# Patient Record
Sex: FEMALE | Race: OTHER | Employment: STUDENT | URBAN - METROPOLITAN AREA
[De-identification: names, ages, dates, MRNs, and addresses within clinical notes are randomized per-mention and may not be internally consistent; named-entity substitution may affect disease eponyms.]

---
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
## 2019-09-26 ENCOUNTER — HOSPITAL ENCOUNTER (OUTPATIENT)
Age: 19
Discharge: HOME | End: 2019-09-26
Attending: EMERGENCY MEDICINE
Payer: COMMERCIAL

## 2019-09-26 ENCOUNTER — NURSE TRIAGE (OUTPATIENT)
Dept: FAMILY MEDICINE CLINIC | Facility: CLINIC | Age: 19
End: 2019-09-26

## 2019-09-26 VITALS
TEMPERATURE: 98 F | SYSTOLIC BLOOD PRESSURE: 114 MMHG | OXYGEN SATURATION: 99 % | RESPIRATION RATE: 18 BRPM | HEIGHT: 62 IN | HEART RATE: 72 BPM | DIASTOLIC BLOOD PRESSURE: 74 MMHG | WEIGHT: 182 LBS | BODY MASS INDEX: 33.49 KG/M2

## 2019-09-26 DIAGNOSIS — R19.7 DIARRHEA, UNSPECIFIED TYPE: Primary | ICD-10-CM

## 2019-09-26 DIAGNOSIS — K52.9 GASTROENTERITIS: ICD-10-CM

## 2019-09-26 LAB
#MXD IC: 1.1 X10ˆ3/UL (ref 0.1–1)
B-HCG UR QL: NEGATIVE
BILIRUB UR QL STRIP: NEGATIVE
CLARITY UR: CLEAR
COLOR UR: YELLOW
CREAT BLD-MCNC: 0.7 MG/DL (ref 0.55–1.02)
GLUCOSE BLD-MCNC: 91 MG/DL (ref 70–99)
GLUCOSE UR STRIP-MCNC: NEGATIVE MG/DL
HCT VFR BLD AUTO: 39.8 % (ref 35–48)
HGB BLD-MCNC: 13.3 G/DL (ref 12–16)
ISTAT BUN: 14 MG/DL (ref 8–20)
ISTAT CHLORIDE: 104 MMOL/L (ref 101–111)
ISTAT HEMATOCRIT: 41 % (ref 34–50)
ISTAT IONIZED CALCIUM FOR CHEM 8: 1.18 MMOL/L (ref 1.12–1.32)
ISTAT POTASSIUM: 3.9 MMOL/L (ref 3.6–5.1)
ISTAT SODIUM: 140 MMOL/L (ref 136–145)
ISTAT TCO2: 26 MMOL/L (ref 22–32)
KETONES UR STRIP-MCNC: NEGATIVE MG/DL
LYMPHOCYTES # BLD AUTO: 3 X10ˆ3/UL (ref 1–4)
LYMPHOCYTES NFR BLD AUTO: 39.1 %
MCH RBC QN AUTO: 32.3 PG (ref 26–34)
MCHC RBC AUTO-ENTMCNC: 33.4 G/DL (ref 31–37)
MCV RBC AUTO: 96.6 FL (ref 80–100)
MIXED CELL %: 14 %
NEUTROPHILS # BLD AUTO: 3.7 X10ˆ3/UL (ref 1.5–7.7)
NEUTROPHILS NFR BLD AUTO: 46.9 %
NITRITE UR QL STRIP: NEGATIVE
PH UR STRIP: 5.5 [PH]
PLATELET # BLD AUTO: 249 X10ˆ3/UL (ref 150–450)
RBC # BLD AUTO: 4.12 X10ˆ6/UL (ref 3.8–5.3)
SP GR UR STRIP: >=1.03
UROBILINOGEN UR STRIP-ACNC: <2 MG/DL
WBC # BLD AUTO: 7.8 X10ˆ3/UL (ref 4–11)

## 2019-09-26 PROCEDURE — 80047 BASIC METABLC PNL IONIZED CA: CPT

## 2019-09-26 PROCEDURE — 99214 OFFICE O/P EST MOD 30 MIN: CPT

## 2019-09-26 PROCEDURE — 81002 URINALYSIS NONAUTO W/O SCOPE: CPT

## 2019-09-26 PROCEDURE — 87086 URINE CULTURE/COLONY COUNT: CPT | Performed by: EMERGENCY MEDICINE

## 2019-09-26 PROCEDURE — 85025 COMPLETE CBC W/AUTO DIFF WBC: CPT | Performed by: EMERGENCY MEDICINE

## 2019-09-26 PROCEDURE — 81025 URINE PREGNANCY TEST: CPT

## 2019-09-26 PROCEDURE — 36415 COLL VENOUS BLD VENIPUNCTURE: CPT

## 2019-09-26 RX ORDER — DICYCLOMINE HCL 20 MG
10 TABLET ORAL 3 TIMES DAILY PRN
Qty: 30 TABLET | Refills: 0 | Status: SHIPPED | OUTPATIENT
Start: 2019-09-26 | End: 2020-01-24

## 2019-09-26 RX ORDER — ONDANSETRON 4 MG/1
4 TABLET, ORALLY DISINTEGRATING ORAL EVERY 4 HOURS PRN
Qty: 10 TABLET | Refills: 0 | Status: SHIPPED | OUTPATIENT
Start: 2019-09-26 | End: 2019-10-03

## 2019-09-26 RX ORDER — PANTOPRAZOLE SODIUM 40 MG/1
40 TABLET, DELAYED RELEASE ORAL
Qty: 15 TABLET | Refills: 0 | Status: SHIPPED | OUTPATIENT
Start: 2019-09-26 | End: 2019-10-26

## 2019-09-26 RX ORDER — ONDANSETRON 4 MG/1
4 TABLET, ORALLY DISINTEGRATING ORAL ONCE
Status: COMPLETED | OUTPATIENT
Start: 2019-09-26 | End: 2019-09-26

## 2019-09-26 RX ORDER — DICYCLOMINE HYDROCHLORIDE 10 MG/5ML
20 SOLUTION ORAL ONCE
Status: COMPLETED | OUTPATIENT
Start: 2019-09-26 | End: 2019-09-26

## 2019-09-26 RX ORDER — FAMOTIDINE 20 MG/1
20 TABLET ORAL ONCE
Status: COMPLETED | OUTPATIENT
Start: 2019-09-26 | End: 2019-09-26

## 2019-09-26 RX ORDER — MAGNESIUM HYDROXIDE/ALUMINUM HYDROXICE/SIMETHICONE 120; 1200; 1200 MG/30ML; MG/30ML; MG/30ML
30 SUSPENSION ORAL ONCE
Status: COMPLETED | OUTPATIENT
Start: 2019-09-26 | End: 2019-09-26

## 2019-09-26 RX ORDER — ACETAMINOPHEN 500 MG
500 TABLET ORAL ONCE
Status: COMPLETED | OUTPATIENT
Start: 2019-09-26 | End: 2019-09-26

## 2019-09-26 NOTE — TELEPHONE ENCOUNTER
Action Requested: Summary for Provider     []  Critical Lab, Recommendations Needed  [] Need Additional Advice  [x]   FYI    []   Need Orders  [] Need Medications Sent to Pharmacy  []  Other     SUMMARY: Per protocol advised ER now and pt agrees.  Staff to

## 2019-09-26 NOTE — TELEPHONE ENCOUNTER
Pt mom calling and states daughter have diarrhea for five days, no appetite and a severe headache, and nauseas just drinking fluids.         Please advise     X-karlee to triage

## 2019-09-26 NOTE — ED INITIAL ASSESSMENT (HPI)
Abdominal pain, nausea, diarrhea, bloating since Sunday. Reports some \"spots of blood\" in stool and some \"black\" stools.

## 2019-09-26 NOTE — TELEPHONE ENCOUNTER
Mother calling as pt is in school. Mother wanting a appt for this evening. I inform mother that we need to speak to pt. Mother verbalized understanding and will have pt call us back.

## 2019-09-26 NOTE — ED PROVIDER NOTES
Patient Seen in: [facility] Immediate Care In [address] [place] [address]    History   Patient presents with:  Abdomen/Flank Pain (GI/)    Stated Complaint: h/a, nausea, bloating, ab pain    HPI    Patient complains of diarrhea, this began Sunday night, bloody(barbara 114/74   Pulse 72   Temp 98.3 °F (36.8 °C) (Oral)   Resp 18   Ht 157.5 cm (5' 2\")   Wt 82.6 kg   SpO2 99%   BMI 33.29 kg/m²   PULSE OX   GENERAL: no acute distress  HEAD: normocephalic, atraumatic,   EYES: PERRLA, EOMI, conj sclera clear  THROAT: mmm, no total) by mouth every morning before breakfast.  Qty: 15 tablet Refills: 0                    Disposition and Plan     Clinical Impression:  Diarrhea, unspecified type  (primary encounter diagnosis)  Gastroenteritis    Disposition:  Discharge    Follow-up:

## 2019-09-27 NOTE — TELEPHONE ENCOUNTER
Patient was seen in urgent care yesterday.      Disposition and Plan    Clinical Impression:  Diarrhea, unspecified type  (primary encounter diagnosis)  Gastroenteritis     Disposition:  Discharge     Follow-up:  No follow-up provider specified.     Medicat

## 2019-12-27 ENCOUNTER — HOSPITAL ENCOUNTER (OUTPATIENT)
Age: 19
Discharge: HOME | End: 2019-12-27
Payer: COMMERCIAL

## 2019-12-27 VITALS
HEIGHT: 62 IN | TEMPERATURE: 99 F | BODY MASS INDEX: 32.76 KG/M2 | DIASTOLIC BLOOD PRESSURE: 78 MMHG | SYSTOLIC BLOOD PRESSURE: 123 MMHG | OXYGEN SATURATION: 97 % | HEART RATE: 80 BPM | RESPIRATION RATE: 18 BRPM | WEIGHT: 178 LBS

## 2019-12-27 DIAGNOSIS — R05.9 COUGH: Primary | ICD-10-CM

## 2019-12-27 LAB — S PYO AG THROAT QL: NEGATIVE

## 2019-12-27 PROCEDURE — 99213 OFFICE O/P EST LOW 20 MIN: CPT

## 2019-12-27 PROCEDURE — 87430 STREP A AG IA: CPT

## 2019-12-27 PROCEDURE — 99214 OFFICE O/P EST MOD 30 MIN: CPT

## 2019-12-27 RX ORDER — FLUTICASONE PROPIONATE 50 MCG
2 SPRAY, SUSPENSION (ML) NASAL DAILY
Qty: 16 G | Refills: 0 | Status: SHIPPED | OUTPATIENT
Start: 2019-12-27 | End: 2020-01-26

## 2019-12-27 RX ORDER — BENZONATATE 100 MG/1
100 CAPSULE ORAL 3 TIMES DAILY PRN
Qty: 30 CAPSULE | Refills: 0 | Status: SHIPPED | OUTPATIENT
Start: 2019-12-27 | End: 2020-01-24

## 2019-12-27 NOTE — ED INITIAL ASSESSMENT (HPI)
Cough x 3 weeks. Feels nauseated with the cough. Reports \"vomiting phlegm\" this morning. Intermittent diarrhea x one week.

## 2019-12-27 NOTE — ED PROVIDER NOTES
Patient presents with:  Cough/URI      HPI:     Scott Dupree is a 20year old female past medical history presents with chief complaint of cough, runny nose, sore throat over the course the last 3 weeks.   States she has had a itchy sore throat as well Flonase. Salt water gargles and tea with honey may help soothe throat. I will also give a prescription for Tessalon Perles as well.       Orders Placed This Encounter      POCT Rapid Strep      benzonatate 100 MG Oral Cap          Sig: Take 1 capsule (100

## 2020-01-24 ENCOUNTER — APPOINTMENT (OUTPATIENT)
Dept: LAB | Age: 20
End: 2020-01-24
Attending: FAMILY MEDICINE
Payer: COMMERCIAL

## 2020-01-24 ENCOUNTER — OFFICE VISIT (OUTPATIENT)
Dept: FAMILY MEDICINE CLINIC | Facility: CLINIC | Age: 20
End: 2020-01-24
Payer: COMMERCIAL

## 2020-01-24 VITALS
TEMPERATURE: 99 F | HEIGHT: 62 IN | SYSTOLIC BLOOD PRESSURE: 118 MMHG | DIASTOLIC BLOOD PRESSURE: 77 MMHG | HEART RATE: 73 BPM | WEIGHT: 174.19 LBS | BODY MASS INDEX: 32.05 KG/M2

## 2020-01-24 DIAGNOSIS — J30.89 OTHER ALLERGIC RHINITIS: ICD-10-CM

## 2020-01-24 DIAGNOSIS — J34.2 DEVIATED NASAL SEPTUM: ICD-10-CM

## 2020-01-24 DIAGNOSIS — J34.89 NASAL OBSTRUCTION: ICD-10-CM

## 2020-01-24 DIAGNOSIS — R51.9 SINUS HEADACHE: ICD-10-CM

## 2020-01-24 DIAGNOSIS — J34.89 NASAL OBSTRUCTION: Primary | ICD-10-CM

## 2020-01-24 PROCEDURE — 36415 COLL VENOUS BLD VENIPUNCTURE: CPT

## 2020-01-24 PROCEDURE — 86003 ALLG SPEC IGE CRUDE XTRC EA: CPT

## 2020-01-24 PROCEDURE — 99214 OFFICE O/P EST MOD 30 MIN: CPT | Performed by: FAMILY MEDICINE

## 2020-01-24 PROCEDURE — 82785 ASSAY OF IGE: CPT

## 2020-01-24 RX ORDER — MONTELUKAST SODIUM 10 MG/1
10 TABLET ORAL NIGHTLY
Qty: 90 TABLET | Refills: 1 | Status: SHIPPED | OUTPATIENT
Start: 2020-01-24 | End: 2020-07-22

## 2020-01-24 NOTE — PROGRESS NOTES
Patient ID: Dimitri Dai is a 20year old female.     HPI  Patient presents with:  Nose Problem: pt states nose always feels congested and she has a hard time breathing  Headache:  states headaches have been going on for a long time    Pt feels her no data.    BP Readings from Last 6 Encounters:  01/24/20 : 118/77  12/27/19 : 123/78  09/26/19 : 114/74  05/07/18 : 137/73  12/12/17 : 123/85  10/23/17 : 118/76        Review of Systems   Constitutional: Negative for chills and fever.    HENT: Positive for co person, place, and time. Skin: Skin is warm. Psychiatry: Normal mood and affect. Vitals reviewed.     Blood pressure 118/77, pulse 73, temperature 98.8 °F (37.1 °C), temperature source Oral, height 5' 2\" (1.575 m), weight 174 lb 3.2 oz (79 kg), last instructions (if applicable) and agree that the record reflects my personal performance and is accurate and complete.   Carlos Cordero DO, 1/24/2020, 12:40 PM

## 2020-01-28 LAB
A ALTERNATA IGE QN: <0.1 KUA/L (ref ?–0.1)
C HERBARUM IGE QN: <0.1 KUA/L (ref ?–0.1)
CAT DANDER IGE QN: <0.1 KUA/L (ref ?–0.1)
COMMON RAGWEED IGE QN: <0.1 KUA/L (ref ?–0.1)
D FARINAE IGE QN: 0.18 KUA/L (ref ?–0.1)
DOG DANDER IGE QN: <0.1 KUA/L (ref ?–0.1)
GOOSEFOOT IGE QN: <0.1 KUA/L (ref ?–0.1)
HOUSE DUST HS IGE QN: <0.1 KUA/L (ref ?–0.1)
IGE SERPL-ACNC: 88.1 KU/L (ref 2–214)
KENT BLUE GRASS IGE QN: 0.12 KUA/L (ref ?–0.1)
PER RYE GRASS IGE QN: 0.14 KUA/L (ref ?–0.1)
WHITE ELM IGE QN: <0.1 KUA/L (ref ?–0.1)
WHITE OAK IGE QN: <0.1 KUA/L (ref ?–0.1)

## 2020-02-05 ENCOUNTER — HOSPITAL ENCOUNTER (OUTPATIENT)
Dept: CT IMAGING | Facility: HOSPITAL | Age: 20
Discharge: HOME | End: 2020-02-05
Attending: FAMILY MEDICINE
Payer: COMMERCIAL

## 2020-02-05 DIAGNOSIS — J34.89 NASAL OBSTRUCTION: ICD-10-CM

## 2020-02-05 DIAGNOSIS — J30.89 OTHER ALLERGIC RHINITIS: ICD-10-CM

## 2020-02-05 DIAGNOSIS — J34.2 DEVIATED NASAL SEPTUM: ICD-10-CM

## 2020-02-05 PROCEDURE — 70486 CT MAXILLOFACIAL W/O DYE: CPT | Performed by: FAMILY MEDICINE

## 2020-02-08 ENCOUNTER — PATIENT MESSAGE (OUTPATIENT)
Dept: FAMILY MEDICINE CLINIC | Facility: CLINIC | Age: 20
End: 2020-02-08

## 2020-04-28 ENCOUNTER — TELEPHONE (OUTPATIENT)
Dept: OTOLARYNGOLOGY | Facility: CLINIC | Age: 20
End: 2020-04-28

## 2020-04-28 NOTE — TELEPHONE ENCOUNTER
Patient returned call. She states she has been dealing with sinus issues all of her life. Recently had CT sinus which showed mild-moderate nasal septal deviation. Dr. Geena Mack would like her to see ENT to discuss possible septoplasty.      Currently she f

## 2020-06-01 ENCOUNTER — OFFICE VISIT (OUTPATIENT)
Dept: OTOLARYNGOLOGY | Facility: CLINIC | Age: 20
End: 2020-06-01
Payer: COMMERCIAL

## 2020-06-01 VITALS
BODY MASS INDEX: 28.52 KG/M2 | DIASTOLIC BLOOD PRESSURE: 68 MMHG | TEMPERATURE: 99 F | SYSTOLIC BLOOD PRESSURE: 106 MMHG | WEIGHT: 155 LBS | HEIGHT: 62 IN

## 2020-06-01 DIAGNOSIS — J34.2 DEVIATED NASAL SEPTUM: Primary | ICD-10-CM

## 2020-06-01 PROCEDURE — 99243 OFF/OP CNSLTJ NEW/EST LOW 30: CPT | Performed by: OTOLARYNGOLOGY

## 2020-06-01 NOTE — PROGRESS NOTES
Harley Hendricks is a 20year old female. Patient presents with:  Nose Problem: pt presents with snoring, trouble breathing through nose     HPI:   For many years she is had difficulty breathing through her nose especially the right side.   She rep Normal Facial features - Normal. Eyebrows - Normal. Skull - Normal.   Oral/Oropharynx Normal Lips - Normal, Tonsils - Normal, Tongue - Normal    Nasal Normal External nose -nasal pyramid deviated to the left side.   Nasal septal deviation to the right   Geovanni

## 2020-08-10 ENCOUNTER — OFFICE VISIT (OUTPATIENT)
Dept: FAMILY MEDICINE CLINIC | Facility: CLINIC | Age: 20
End: 2020-08-10
Payer: COMMERCIAL

## 2020-08-10 ENCOUNTER — LAB ENCOUNTER (OUTPATIENT)
Dept: LAB | Age: 20
End: 2020-08-10
Attending: FAMILY MEDICINE
Payer: COMMERCIAL

## 2020-08-10 VITALS
HEIGHT: 62 IN | SYSTOLIC BLOOD PRESSURE: 120 MMHG | TEMPERATURE: 98 F | DIASTOLIC BLOOD PRESSURE: 77 MMHG | HEART RATE: 79 BPM | BODY MASS INDEX: 27.45 KG/M2 | WEIGHT: 149.19 LBS

## 2020-08-10 DIAGNOSIS — L65.9 HAIR LOSS: ICD-10-CM

## 2020-08-10 DIAGNOSIS — Z00.00 ADULT GENERAL MEDICAL EXAM: ICD-10-CM

## 2020-08-10 DIAGNOSIS — Z83.49 FAMILY HISTORY OF HYPOTHYROIDISM: ICD-10-CM

## 2020-08-10 DIAGNOSIS — Z00.00 ADULT GENERAL MEDICAL EXAM: Primary | ICD-10-CM

## 2020-08-10 LAB
ALBUMIN SERPL-MCNC: 3.7 G/DL (ref 3.4–5)
ALBUMIN/GLOB SERPL: 0.9 {RATIO} (ref 1–2)
ALP LIVER SERPL-CCNC: 55 U/L (ref 52–144)
ALT SERPL-CCNC: 14 U/L (ref 13–56)
ANION GAP SERPL CALC-SCNC: 4 MMOL/L (ref 0–18)
AST SERPL-CCNC: 12 U/L (ref 15–37)
BASOPHILS # BLD AUTO: 0.03 X10(3) UL (ref 0–0.2)
BASOPHILS NFR BLD AUTO: 0.4 %
BILIRUB SERPL-MCNC: 0.4 MG/DL (ref 0.1–2)
BUN BLD-MCNC: 11 MG/DL (ref 7–18)
BUN/CREAT SERPL: 12.9 (ref 10–20)
CALCIUM BLD-MCNC: 8.9 MG/DL (ref 8.5–10.1)
CHLORIDE SERPL-SCNC: 107 MMOL/L (ref 98–112)
CHOLEST SMN-MCNC: 133 MG/DL (ref ?–200)
CO2 SERPL-SCNC: 29 MMOL/L (ref 21–32)
CREAT BLD-MCNC: 0.85 MG/DL (ref 0.55–1.02)
DEPRECATED RDW RBC AUTO: 45.9 FL (ref 35.1–46.3)
EOSINOPHIL # BLD AUTO: 0.19 X10(3) UL (ref 0–0.7)
EOSINOPHIL NFR BLD AUTO: 2.6 %
ERYTHROCYTE [DISTWIDTH] IN BLOOD BY AUTOMATED COUNT: 12.5 % (ref 11–15)
GLOBULIN PLAS-MCNC: 4 G/DL (ref 2.8–4.4)
GLUCOSE BLD-MCNC: 84 MG/DL (ref 70–99)
HCT VFR BLD AUTO: 39.7 % (ref 35–48)
HDLC SERPL-MCNC: 58 MG/DL (ref 40–59)
HGB BLD-MCNC: 13.1 G/DL (ref 12–16)
IMM GRANULOCYTES # BLD AUTO: 0.01 X10(3) UL (ref 0–1)
IMM GRANULOCYTES NFR BLD: 0.1 %
LDLC SERPL CALC-MCNC: 62 MG/DL (ref ?–100)
LYMPHOCYTES # BLD AUTO: 2.18 X10(3) UL (ref 1–4)
LYMPHOCYTES NFR BLD AUTO: 30.4 %
M PROTEIN MFR SERPL ELPH: 7.7 G/DL (ref 6.4–8.2)
MCH RBC QN AUTO: 33.1 PG (ref 26–34)
MCHC RBC AUTO-ENTMCNC: 33 G/DL (ref 31–37)
MCV RBC AUTO: 100.3 FL (ref 80–100)
MONOCYTES # BLD AUTO: 0.61 X10(3) UL (ref 0.1–1)
MONOCYTES NFR BLD AUTO: 8.5 %
NEUTROPHILS # BLD AUTO: 4.15 X10 (3) UL (ref 1.5–7.7)
NEUTROPHILS # BLD AUTO: 4.15 X10(3) UL (ref 1.5–7.7)
NEUTROPHILS NFR BLD AUTO: 58 %
NONHDLC SERPL-MCNC: 75 MG/DL (ref ?–130)
OSMOLALITY SERPL CALC.SUM OF ELEC: 289 MOSM/KG (ref 275–295)
PATIENT FASTING Y/N/NP: YES
PATIENT FASTING Y/N/NP: YES
PLATELET # BLD AUTO: 221 10(3)UL (ref 150–450)
POTASSIUM SERPL-SCNC: 4.4 MMOL/L (ref 3.5–5.1)
RBC # BLD AUTO: 3.96 X10(6)UL (ref 3.8–5.3)
SODIUM SERPL-SCNC: 140 MMOL/L (ref 136–145)
T4 FREE SERPL-MCNC: 1 NG/DL (ref 0.8–1.7)
TRIGL SERPL-MCNC: 67 MG/DL (ref 30–149)
TSI SER-ACNC: 1.78 MIU/ML (ref 0.36–3.74)
VLDLC SERPL CALC-MCNC: 13 MG/DL (ref 0–30)
WBC # BLD AUTO: 7.2 X10(3) UL (ref 4–11)

## 2020-08-10 PROCEDURE — 80053 COMPREHEN METABOLIC PANEL: CPT

## 2020-08-10 PROCEDURE — 82306 VITAMIN D 25 HYDROXY: CPT

## 2020-08-10 PROCEDURE — 99212 OFFICE O/P EST SF 10 MIN: CPT | Performed by: FAMILY MEDICINE

## 2020-08-10 PROCEDURE — 36415 COLL VENOUS BLD VENIPUNCTURE: CPT

## 2020-08-10 PROCEDURE — 84439 ASSAY OF FREE THYROXINE: CPT

## 2020-08-10 PROCEDURE — 3008F BODY MASS INDEX DOCD: CPT | Performed by: FAMILY MEDICINE

## 2020-08-10 PROCEDURE — 3078F DIAST BP <80 MM HG: CPT | Performed by: FAMILY MEDICINE

## 2020-08-10 PROCEDURE — 84443 ASSAY THYROID STIM HORMONE: CPT

## 2020-08-10 PROCEDURE — 85025 COMPLETE CBC W/AUTO DIFF WBC: CPT

## 2020-08-10 PROCEDURE — 80061 LIPID PANEL: CPT

## 2020-08-10 PROCEDURE — 3074F SYST BP LT 130 MM HG: CPT | Performed by: FAMILY MEDICINE

## 2020-08-10 PROCEDURE — 99395 PREV VISIT EST AGE 18-39: CPT | Performed by: FAMILY MEDICINE

## 2020-08-10 NOTE — PROGRESS NOTES
Patient ID: Macy Finch is a 20year old female. HPI  Patient presents with:  Physical: hair loss     Last seen by me on 1/24/2020. Pt is working and in school. Pt has been experiencing hair loss since April that is worsening.  Pt st HCT 39.3 06/07/2014     06/07/2014    MPV 9.8 06/07/2014    MCV 96.6 09/26/2019    MCH 31.4 06/07/2014    MCHC 33.4 09/26/2019    RDW 14.0 06/07/2014    NEUT 4.5 06/07/2014    LYMPH 2.3 06/07/2014    MON 0.8 06/07/2014    EOS 0.2 06/07/2014    TYRELL kg/m²  12/27/19 : 32.56 kg/m²  09/26/19 : 33.29 kg/m²  05/07/18 : 32.42 kg/m² (96 %, Z= 1.79)*    * Growth percentiles are based on CDC (Girls, 2-20 Years) data.     BP Readings from Last 6 Encounters:  08/10/20 : 120/77  06/01/20 : 106/68  01/24/20 : 118/7 connections:        Talks on phone: Not on file        Gets together: Not on file        Attends Voodoo service: Not on file        Active member of club or organization: Not on file        Attends meetings of clubs or organizations: Not on file Soft. Bowel sounds are normal. There is no hepatosplenomegaly. There is no tenderness. Lymphadenopathy:     She has no cervical adenopathy. Lower legs: No edema of the legs bilaterally. Good pedal pulses.   Neurological: She is alert and oriented to per described in this documentation. All medical record entries made by the scribe were at my direction and in my presence.   I have reviewed the chart and discharge instructions (if applicable) and agree that the record reflects my personal performance and is

## 2020-08-10 NOTE — PROGRESS NOTES
Patient ID: Jaya Cordoba is a 20year old female. No chief complaint on file. 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 She does not smoke.     The patient denies any CP, SOB, blood in the stool, or bl Date    GLU 91 06/07/2014    BUN 15 06/07/2014    BUNCREA 20.8 (H) 06/07/2014    CREATSERUM 0.72 06/07/2014    ANIONGAP 6 06/07/2014    GFRNAA 125 09/26/2019    GFRAA 144 09/26/2019    CA 9.8 06/07/2014    OSMOCALC 290 06/07/2014    AST 22 06/07/2014    AL Cardiovascular: Negative for chest pain. Gastrointestinal: Negative for abdominal pain and blood in stool. Genitourinary: Negative for hematuria. Musculoskeletal: Positive for back pain. Skin: Negative for color change.    Allergic/Immunologic: Ne if symptoms persist.  Take medicine (if given) as prescribed. Approach to treatment discussed and patient/family member understands and agrees to plan. No follow-ups on file. There are no Patient Instructions on file for this visit.         Josiah Campos

## 2020-08-12 LAB — 25(OH)D3 SERPL-MCNC: 25.4 NG/ML (ref 30–100)

## 2021-04-20 ENCOUNTER — HOSPITAL ENCOUNTER (OUTPATIENT)
Age: 21
Discharge: HOME | End: 2021-04-20
Payer: COMMERCIAL

## 2021-04-20 VITALS
OXYGEN SATURATION: 100 % | HEART RATE: 82 BPM | RESPIRATION RATE: 18 BRPM | DIASTOLIC BLOOD PRESSURE: 89 MMHG | TEMPERATURE: 99 F | SYSTOLIC BLOOD PRESSURE: 139 MMHG

## 2021-04-20 DIAGNOSIS — J02.0 STREPTOCOCCAL SORE THROAT: Primary | ICD-10-CM

## 2021-04-20 PROCEDURE — 87880 STREP A ASSAY W/OPTIC: CPT | Performed by: NURSE PRACTITIONER

## 2021-04-20 PROCEDURE — U0002 COVID-19 LAB TEST NON-CDC: HCPCS | Performed by: NURSE PRACTITIONER

## 2021-04-20 PROCEDURE — 99213 OFFICE O/P EST LOW 20 MIN: CPT | Performed by: NURSE PRACTITIONER

## 2021-04-20 RX ORDER — AMOXICILLIN 500 MG/1
500 TABLET, FILM COATED ORAL 2 TIMES DAILY
Qty: 20 TABLET | Refills: 0 | Status: SHIPPED | OUTPATIENT
Start: 2021-04-20 | End: 2021-04-30

## 2021-04-21 NOTE — ED PROVIDER NOTES
Patient Seen in: [facility]      History   Patient presents with:  Sore Throat    Stated Complaint: sore throat congestion     HPI/Subjective:   HPI    69-year-old female with no significant past medical history here today with complaints of +air entry bilaterally without wheezes, rhonchi, crackles. No accessory muscle use or tachypnea. Abdomen: Soft, nontender, nondistended. Active bowel sounds present. Back: No CVA tenderness. Extremities: No edema. Pulses 2+ extremities. positive. Does not appear clinically dehydrated, tolerating oral intake in the emergency room. Prescription given for amoxicillin and instructions given on use. Counseled patient on symptomatic treatments. Recommend follow-up with PCP.   Return to ED pr

## 2021-07-27 ENCOUNTER — OFFICE VISIT (OUTPATIENT)
Dept: FAMILY MEDICINE CLINIC | Facility: CLINIC | Age: 21
End: 2021-07-27
Payer: COMMERCIAL

## 2021-07-27 ENCOUNTER — LAB ENCOUNTER (OUTPATIENT)
Dept: LAB | Age: 21
End: 2021-07-27
Attending: FAMILY MEDICINE
Payer: COMMERCIAL

## 2021-07-27 VITALS — WEIGHT: 143 LBS | TEMPERATURE: 98 F | HEIGHT: 63 IN | BODY MASS INDEX: 25.34 KG/M2

## 2021-07-27 DIAGNOSIS — Z01.419 ENCOUNTER FOR WELL WOMAN EXAM WITH ROUTINE GYNECOLOGICAL EXAM: ICD-10-CM

## 2021-07-27 DIAGNOSIS — Z01.419 ENCOUNTER FOR WELL WOMAN EXAM WITH ROUTINE GYNECOLOGICAL EXAM: Primary | ICD-10-CM

## 2021-07-27 DIAGNOSIS — Z11.3 ROUTINE SCREENING FOR STI (SEXUALLY TRANSMITTED INFECTION): ICD-10-CM

## 2021-07-27 DIAGNOSIS — Z30.9 ENCOUNTER FOR CONTRACEPTIVE MANAGEMENT, UNSPECIFIED TYPE: ICD-10-CM

## 2021-07-27 LAB
ALBUMIN SERPL-MCNC: 3.9 G/DL (ref 3.4–5)
ALBUMIN/GLOB SERPL: 0.9 {RATIO} (ref 1–2)
ALP LIVER SERPL-CCNC: 50 U/L
ALT SERPL-CCNC: 16 U/L
ANION GAP SERPL CALC-SCNC: 6 MMOL/L (ref 0–18)
AST SERPL-CCNC: 11 U/L (ref 15–37)
BILIRUB SERPL-MCNC: 0.3 MG/DL (ref 0.1–2)
BUN BLD-MCNC: 17 MG/DL (ref 7–18)
BUN/CREAT SERPL: 23 (ref 10–20)
CALCIUM BLD-MCNC: 9.3 MG/DL (ref 8.5–10.1)
CHLORIDE SERPL-SCNC: 107 MMOL/L (ref 98–112)
CHOLEST SMN-MCNC: 159 MG/DL (ref ?–200)
CO2 SERPL-SCNC: 27 MMOL/L (ref 21–32)
CREAT BLD-MCNC: 0.74 MG/DL
DEPRECATED RDW RBC AUTO: 44.9 FL (ref 35.1–46.3)
ERYTHROCYTE [DISTWIDTH] IN BLOOD BY AUTOMATED COUNT: 12.4 % (ref 11–15)
GLOBULIN PLAS-MCNC: 4.4 G/DL (ref 2.8–4.4)
GLUCOSE BLD-MCNC: 87 MG/DL (ref 70–99)
HAV IGM SER QL: NONREACTIVE
HBV CORE IGM SER QL: NONREACTIVE
HBV SURFACE AG SERPL QL IA: NONREACTIVE
HCT VFR BLD AUTO: 40.5 %
HCV AB SERPL QL IA: NONREACTIVE
HDLC SERPL-MCNC: 71 MG/DL (ref 40–59)
HGB BLD-MCNC: 13.2 G/DL
LDLC SERPL CALC-MCNC: 78 MG/DL (ref ?–100)
M PROTEIN MFR SERPL ELPH: 8.3 G/DL (ref 6.4–8.2)
MCH RBC QN AUTO: 32 PG (ref 26–34)
MCHC RBC AUTO-ENTMCNC: 32.6 G/DL (ref 31–37)
MCV RBC AUTO: 98.3 FL
NONHDLC SERPL-MCNC: 88 MG/DL (ref ?–130)
OSMOLALITY SERPL CALC.SUM OF ELEC: 291 MOSM/KG (ref 275–295)
PATIENT FASTING Y/N/NP: YES
PATIENT FASTING Y/N/NP: YES
PLATELET # BLD AUTO: 279 10(3)UL (ref 150–450)
POTASSIUM SERPL-SCNC: 4.1 MMOL/L (ref 3.5–5.1)
RBC # BLD AUTO: 4.12 X10(6)UL
SODIUM SERPL-SCNC: 140 MMOL/L (ref 136–145)
TRIGL SERPL-MCNC: 43 MG/DL (ref 30–149)
TSI SER-ACNC: 1.7 MIU/ML (ref 0.36–3.74)
VLDLC SERPL CALC-MCNC: 7 MG/DL (ref 0–30)
WBC # BLD AUTO: 7.2 X10(3) UL (ref 4–11)

## 2021-07-27 PROCEDURE — 87491 CHLMYD TRACH DNA AMP PROBE: CPT

## 2021-07-27 PROCEDURE — 80074 ACUTE HEPATITIS PANEL: CPT

## 2021-07-27 PROCEDURE — 87591 N.GONORRHOEAE DNA AMP PROB: CPT

## 2021-07-27 PROCEDURE — 99395 PREV VISIT EST AGE 18-39: CPT | Performed by: FAMILY MEDICINE

## 2021-07-27 PROCEDURE — 3008F BODY MASS INDEX DOCD: CPT | Performed by: FAMILY MEDICINE

## 2021-07-27 PROCEDURE — 36415 COLL VENOUS BLD VENIPUNCTURE: CPT

## 2021-07-27 PROCEDURE — 84443 ASSAY THYROID STIM HORMONE: CPT

## 2021-07-27 PROCEDURE — 86780 TREPONEMA PALLIDUM: CPT

## 2021-07-27 PROCEDURE — 86696 HERPES SIMPLEX TYPE 2 TEST: CPT

## 2021-07-27 PROCEDURE — 86695 HERPES SIMPLEX TYPE 1 TEST: CPT

## 2021-07-27 PROCEDURE — 80053 COMPREHEN METABOLIC PANEL: CPT

## 2021-07-27 PROCEDURE — 87389 HIV-1 AG W/HIV-1&-2 AB AG IA: CPT

## 2021-07-27 PROCEDURE — 80061 LIPID PANEL: CPT

## 2021-07-27 PROCEDURE — 85027 COMPLETE CBC AUTOMATED: CPT

## 2021-07-27 RX ORDER — NORGESTIMATE AND ETHINYL ESTRADIOL 7DAYSX3 LO
1 KIT ORAL DAILY
Qty: 1 EACH | Refills: 2 | Status: SHIPPED | OUTPATIENT
Start: 2021-07-27 | End: 2021-10-14

## 2021-07-27 RX ORDER — HYDROCODONE BITARTRATE AND ACETAMINOPHEN 5; 325 MG/1; MG/1
1 TABLET ORAL
COMMUNITY
Start: 2020-10-15 | End: 2021-07-27

## 2021-07-27 NOTE — PROGRESS NOTES
HPI/Subjective:   Patient ID: Shari Wetzel is a 23year old female. Pt is here for her routine well woman exam and pap smear. Pt is also interested in starting birth control. No acute issues. No chronic medical problems.   No family history normal.      Pupils: Pupils are equal, round, and reactive to light. Cardiovascular:      Rate and Rhythm: Normal rate and regular rhythm. Heart sounds: Normal heart sounds.    Pulmonary:      Effort: Pulmonary effort is normal.      Breath sounds: N unspecified type:  - After discussion, will start oral contraception as discussed; discussed benefits and potential side effects; to call if problems or side effects; follow up in one month to reevaluate as needed. Answered questions.      Orders Placed Thi

## 2021-07-28 ENCOUNTER — TELEPHONE (OUTPATIENT)
Dept: FAMILY MEDICINE CLINIC | Facility: CLINIC | Age: 21
End: 2021-07-28

## 2021-07-28 LAB
C TRACH DNA SPEC QL NAA+PROBE: POSITIVE
N GONORRHOEA DNA SPEC QL NAA+PROBE: NEGATIVE

## 2021-07-28 RX ORDER — AZITHROMYCIN 500 MG/1
1000 TABLET, FILM COATED ORAL ONCE
Qty: 2 TABLET | Refills: 0 | Status: SHIPPED | OUTPATIENT
Start: 2021-07-28 | End: 2021-07-28

## 2021-07-28 NOTE — TELEPHONE ENCOUNTER
Pt contacted. Reviewed positive chlamydia test. Will start zithromax as discussed. To follow up in 2-3 weeks to recheck testing. To refrain from sexual activity and to contact any sexual contacts.  Patient verbalized understanding of recommendations and agr

## 2021-07-28 NOTE — TELEPHONE ENCOUNTER
Patient returning a missed call from Dr. Fela Salas. Per patient she is at work and it's difficult to just talk on the phone. She is asking if Dr. Fela Salas can call her today at 3 pm [phone].  Advised that he is seeing patient's and may not be able to do this call

## 2021-07-28 NOTE — TELEPHONE ENCOUNTER
Results noted. Attempted to call but no answer. Voice message left to call back to discuss results. Please have pt call back to discuss results.

## 2021-07-29 LAB
C TRACH DNA SPEC QL NAA+PROBE: POSITIVE
N GONORRHOEA DNA SPEC QL NAA+PROBE: NEGATIVE

## 2021-07-30 ENCOUNTER — TELEPHONE (OUTPATIENT)
Dept: FAMILY MEDICINE CLINIC | Facility: CLINIC | Age: 21
End: 2021-07-30

## 2021-07-30 LAB
HSV 1 GLYCOPROTEIN G, IGG: POSITIVE
HSV 2 GLYCOPROTEIN G, IGG: NEGATIVE
T PALLIDUM AB SER QL: NEGATIVE

## 2021-07-31 NOTE — TELEPHONE ENCOUNTER
STI testing unremarkable except positive HSV 1 and previous positive chlamydia testing which was discussed and treated. Voice message left and can call back to discuss. HSV1 usually associated with oral herpes/ cold sores.  To call back to discuss when christy

## 2021-08-10 ENCOUNTER — LAB ENCOUNTER (OUTPATIENT)
Dept: LAB | Age: 21
End: 2021-08-10
Attending: FAMILY MEDICINE
Payer: COMMERCIAL

## 2021-08-10 ENCOUNTER — OFFICE VISIT (OUTPATIENT)
Dept: FAMILY MEDICINE CLINIC | Facility: CLINIC | Age: 21
End: 2021-08-10
Payer: COMMERCIAL

## 2021-08-10 VITALS
BODY MASS INDEX: 26.74 KG/M2 | HEIGHT: 62.7 IN | WEIGHT: 149 LBS | RESPIRATION RATE: 20 BRPM | DIASTOLIC BLOOD PRESSURE: 58 MMHG | HEART RATE: 89 BPM | TEMPERATURE: 99 F | SYSTOLIC BLOOD PRESSURE: 95 MMHG

## 2021-08-10 DIAGNOSIS — Z86.19 HISTORY OF CHLAMYDIA: ICD-10-CM

## 2021-08-10 PROCEDURE — 87591 N.GONORRHOEAE DNA AMP PROB: CPT

## 2021-08-10 PROCEDURE — 3008F BODY MASS INDEX DOCD: CPT | Performed by: FAMILY MEDICINE

## 2021-08-10 PROCEDURE — 3074F SYST BP LT 130 MM HG: CPT | Performed by: FAMILY MEDICINE

## 2021-08-10 PROCEDURE — 3078F DIAST BP <80 MM HG: CPT | Performed by: FAMILY MEDICINE

## 2021-08-10 PROCEDURE — 99213 OFFICE O/P EST LOW 20 MIN: CPT | Performed by: FAMILY MEDICINE

## 2021-08-10 PROCEDURE — 87491 CHLMYD TRACH DNA AMP PROBE: CPT

## 2021-08-10 NOTE — PROGRESS NOTES
HPI/Subjective:   Patient ID: Jhonatan Johnson is a 23year old female. Pt presents for follow up after having positive Chlamydia testing. Pt was treated with zithromax and no symptoms. Pt also started OCP recently and doing pretty well.  No mackenzie

## 2021-08-12 LAB
C TRACH DNA SPEC QL NAA+PROBE: NEGATIVE
N GONORRHOEA DNA SPEC QL NAA+PROBE: NEGATIVE

## 2021-09-16 ENCOUNTER — MED REC SCAN ONLY (OUTPATIENT)
Dept: FAMILY MEDICINE CLINIC | Facility: CLINIC | Age: 21
End: 2021-09-16

## 2021-09-16 ENCOUNTER — TELEPHONE (OUTPATIENT)
Dept: FAMILY MEDICINE CLINIC | Facility: CLINIC | Age: 21
End: 2021-09-16

## 2021-09-16 NOTE — TELEPHONE ENCOUNTER
Prudencio RN from health department calling asking about treatment for positive chlamydia from 7/2021. All questions answered. No further action required.       Authorizing Provider Encounter Provider   MD Umm Alexander MD       Medication

## 2021-10-14 RX ORDER — NORGESTIMATE AND ETHINYL ESTRADIOL 7DAYSX3 LO
1 KIT ORAL DAILY
Qty: 1 EACH | Refills: 2 | Status: SHIPPED | OUTPATIENT
Start: 2021-10-14 | End: 2021-11-22

## 2021-10-14 NOTE — TELEPHONE ENCOUNTER
Refill passed per [facility] protocol. Last pap 7/27/21. Melania Vargas MD   8/2/2021  5:26 PM CDT Back to Top        Unremarkable pap smear results. Pt contacted and discussed results and also previous STI testing and answered questions. To call if questions. Routine follow up. Requested Prescriptions   Pending Prescriptions Disp Refills    Norgestim-Eth Estrad Triphasic (ORTHO TRI-CYCLEN LO) 0.18/0.215/0.25 MG-25 MCG Oral Tab 1 each 2     Sig: Take 1 tablet by mouth daily.         Gynecology Medication Protocol Failed - 10/14/2021 11:36 AM        Failed - Pass dependent on manual look-up of last PAP and patient compliance with PAP follow up recommendations        Passed - Appointment in past 12 or next 3 months              Recent Outpatient Visits              2 months ago History of chlamydia    Ed Madden MD    Office Visit    2 months ago Encounter for well woman exam with routine gynecological exam    Ed Madden MD    Office Visit    1 year ago Adult general medical exam    [facility], [facility], [address], [address], [place],     Office Visit    1 year ago Deviated nasal septum    [facility], [address], Carlos Recinos MD    Office Visit    1 year ago Nasal obstruction    [address], [place],     Office Visit

## 2021-10-22 ENCOUNTER — TELEPHONE (OUTPATIENT)
Dept: FAMILY MEDICINE CLINIC | Facility: CLINIC | Age: 21
End: 2021-10-22

## 2021-10-22 ENCOUNTER — PATIENT MESSAGE (OUTPATIENT)
Dept: FAMILY MEDICINE CLINIC | Facility: CLINIC | Age: 21
End: 2021-10-22

## 2021-10-22 NOTE — TELEPHONE ENCOUNTER
Spoke to patient. See medication question TE from 10/22.       From: Kurtis Meza  To: Shalonda Flynn MD  Sent: 10/22/2021  7:15 AM CDT  Subject: Tre Denny,     The birth control you started me on is going good

## 2021-10-22 NOTE — TELEPHONE ENCOUNTER
Patient called because she was started on birth control a few months ago. She asked for refill and was given a birth control pack that looks different than her last one.  Relayed to her that she should call pharmacy and check if what she was given was an eq

## 2021-11-23 RX ORDER — NORGESTIMATE AND ETHINYL ESTRADIOL 7DAYSX3 LO
1 KIT ORAL DAILY
Qty: 1 EACH | Refills: 11 | Status: SHIPPED | OUTPATIENT
Start: 2021-11-23 | End: 2022-01-17

## 2021-11-23 NOTE — TELEPHONE ENCOUNTER
Message noted: Chart reviewed and may refill medication as requested times one with 11 additional refills. Prescription sent to listed pharmacy. Pharmacy to notify patient.  Pt notified through [facility]

## 2021-12-08 ENCOUNTER — OFFICE VISIT (OUTPATIENT)
Dept: FAMILY MEDICINE CLINIC | Facility: CLINIC | Age: 21
End: 2021-12-08
Payer: COMMERCIAL

## 2021-12-08 ENCOUNTER — LAB ENCOUNTER (OUTPATIENT)
Dept: LAB | Age: 21
End: 2021-12-08
Attending: FAMILY MEDICINE
Payer: COMMERCIAL

## 2021-12-08 VITALS
HEIGHT: 62.5 IN | HEART RATE: 94 BPM | SYSTOLIC BLOOD PRESSURE: 108 MMHG | BODY MASS INDEX: 27.27 KG/M2 | DIASTOLIC BLOOD PRESSURE: 67 MMHG | RESPIRATION RATE: 20 BRPM | TEMPERATURE: 99 F | WEIGHT: 152 LBS

## 2021-12-08 DIAGNOSIS — Z02.0 SCHOOL PHYSICAL EXAM: Primary | ICD-10-CM

## 2021-12-08 DIAGNOSIS — Z02.0 SCHOOL PHYSICAL EXAM: ICD-10-CM

## 2021-12-08 PROCEDURE — 99212 OFFICE O/P EST SF 10 MIN: CPT | Performed by: FAMILY MEDICINE

## 2021-12-08 PROCEDURE — 3008F BODY MASS INDEX DOCD: CPT | Performed by: FAMILY MEDICINE

## 2021-12-08 PROCEDURE — 90472 IMMUNIZATION ADMIN EACH ADD: CPT | Performed by: FAMILY MEDICINE

## 2021-12-08 PROCEDURE — 90471 IMMUNIZATION ADMIN: CPT | Performed by: FAMILY MEDICINE

## 2021-12-08 PROCEDURE — 36415 COLL VENOUS BLD VENIPUNCTURE: CPT

## 2021-12-08 PROCEDURE — 3078F DIAST BP <80 MM HG: CPT | Performed by: FAMILY MEDICINE

## 2021-12-08 PROCEDURE — 90715 TDAP VACCINE 7 YRS/> IM: CPT | Performed by: FAMILY MEDICINE

## 2021-12-08 PROCEDURE — 90686 IIV4 VACC NO PRSV 0.5 ML IM: CPT | Performed by: FAMILY MEDICINE

## 2021-12-08 PROCEDURE — 86480 TB TEST CELL IMMUN MEASURE: CPT

## 2021-12-08 PROCEDURE — 3074F SYST BP LT 130 MM HG: CPT | Performed by: FAMILY MEDICINE

## 2021-12-08 NOTE — PROGRESS NOTES
Subjective:   Patient ID: Karlie Deutsch is a 24year old female. 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 Pt needs immunizations and TB titer. Pt had her physical earlier in the year.        History/Other:   Review of Systems   Constitut

## 2021-12-13 ENCOUNTER — APPOINTMENT (OUTPATIENT)
Dept: OCCUPATIONAL MEDICINE | Age: 21
End: 2021-12-13
Attending: FAMILY MEDICINE

## 2021-12-14 ENCOUNTER — TELEPHONE (OUTPATIENT)
Dept: FAMILY MEDICINE CLINIC | Facility: CLINIC | Age: 21
End: 2021-12-14

## 2021-12-14 DIAGNOSIS — Z02.0 SCHOOL PHYSICAL EXAM: Primary | ICD-10-CM

## 2021-12-15 ENCOUNTER — LAB ENCOUNTER (OUTPATIENT)
Dept: LAB | Age: 21
End: 2021-12-15
Attending: FAMILY MEDICINE
Payer: COMMERCIAL

## 2021-12-15 DIAGNOSIS — Z02.0 SCHOOL PHYSICAL EXAM: ICD-10-CM

## 2021-12-15 PROCEDURE — 86706 HEP B SURFACE ANTIBODY: CPT

## 2021-12-15 PROCEDURE — 86762 RUBELLA ANTIBODY: CPT

## 2021-12-15 PROCEDURE — 86765 RUBEOLA ANTIBODY: CPT

## 2021-12-15 PROCEDURE — 36415 COLL VENOUS BLD VENIPUNCTURE: CPT

## 2021-12-15 PROCEDURE — 86787 VARICELLA-ZOSTER ANTIBODY: CPT

## 2021-12-15 PROCEDURE — 86735 MUMPS ANTIBODY: CPT

## 2021-12-16 ENCOUNTER — PATIENT MESSAGE (OUTPATIENT)
Dept: FAMILY MEDICINE CLINIC | Facility: CLINIC | Age: 21
End: 2021-12-16

## 2021-12-17 NOTE — TELEPHONE ENCOUNTER
Patient calling requesting that all lab work done on 12/15/21 be released in My Chart. Patient also requesting that copies of all lab work be printed out and placed at  for  today.    Patient also has a form that can be filled out by the

## 2021-12-18 NOTE — TELEPHONE ENCOUNTER
LM to Pt- results printed and placed at . Dr Ravinder Chu not in office today he will be in Mon-Thurs next week if her form needs his signature.

## 2021-12-21 ENCOUNTER — APPOINTMENT (OUTPATIENT)
Dept: OCCUPATIONAL MEDICINE | Age: 21
End: 2021-12-21
Attending: FAMILY MEDICINE

## 2022-01-17 RX ORDER — NORGESTIMATE AND ETHINYL ESTRADIOL 7DAYSX3 LO
1 KIT ORAL DAILY
Qty: 1 EACH | Refills: 11 | Status: SHIPPED | OUTPATIENT
Start: 2022-01-17 | End: 2023-01-17

## 2022-01-17 NOTE — TELEPHONE ENCOUNTER
Message noted: Chart reviewed and may refill medication as requested. Prescription sent to listed pharmacy. Pharmacy to notify patient.  Pt notified through [facility]

## 2022-03-09 ENCOUNTER — NURSE ONLY (OUTPATIENT)
Dept: FAMILY MEDICINE CLINIC | Facility: CLINIC | Age: 22
End: 2022-03-09
Payer: COMMERCIAL

## 2022-03-09 ENCOUNTER — TELEPHONE (OUTPATIENT)
Dept: FAMILY MEDICINE CLINIC | Facility: CLINIC | Age: 22
End: 2022-03-09

## 2022-03-09 DIAGNOSIS — Z23 IMMUNIZATION DUE: Primary | ICD-10-CM

## 2022-03-09 PROCEDURE — 90471 IMMUNIZATION ADMIN: CPT | Performed by: FAMILY MEDICINE

## 2022-03-09 PROCEDURE — 90746 HEPB VACCINE 3 DOSE ADULT IM: CPT | Performed by: FAMILY MEDICINE

## 2022-03-09 NOTE — TELEPHONE ENCOUNTER
Hello, pt was seen in office for nurse visit, has concerns with recent lab work completed, would like a call to know results for lab please

## 2022-03-09 NOTE — TELEPHONE ENCOUNTER
Pt seen for vaccination for hep b, notiifed hep b #2 will be administered in 2 monthsm 3rd hep b will be administered in 4 months from 2nd hep b.  Pt requested a letter for school with date of hep b and other dates for hep bs, message sent to pt through [employer] with letter for school     Pt notified letter ready for  at      Orders for hep b #2 and #3 are in system

## 2022-03-22 ENCOUNTER — TELEPHONE (OUTPATIENT)
Dept: FAMILY MEDICINE CLINIC | Facility: CLINIC | Age: 22
End: 2022-03-22

## 2022-03-22 NOTE — TELEPHONE ENCOUNTER
Patient reports she received Hep B vaccine 3/9/22, she will need a print out of her vaccine info to show proof she received. Patient also noted she had 1st Hep B 12/21/21 at another clinic, advised to forward the vaccine info so we can update record to include the Dec dose. Patient will stop by office today for .

## 2022-05-17 ENCOUNTER — APPOINTMENT (OUTPATIENT)
Dept: GENERAL RADIOLOGY | Age: 22
End: 2022-05-17
Payer: COMMERCIAL

## 2022-05-17 ENCOUNTER — HOSPITAL ENCOUNTER (OUTPATIENT)
Age: 22
Discharge: HOME | End: 2022-05-17
Payer: COMMERCIAL

## 2022-05-17 VITALS
OXYGEN SATURATION: 100 % | HEIGHT: 62 IN | TEMPERATURE: 98 F | RESPIRATION RATE: 18 BRPM | BODY MASS INDEX: 27.6 KG/M2 | SYSTOLIC BLOOD PRESSURE: 146 MMHG | HEART RATE: 77 BPM | DIASTOLIC BLOOD PRESSURE: 64 MMHG | WEIGHT: 150 LBS

## 2022-05-17 DIAGNOSIS — R00.2 PALPITATIONS: ICD-10-CM

## 2022-05-17 DIAGNOSIS — R07.89 CHEST TIGHTNESS: Primary | ICD-10-CM

## 2022-05-17 LAB
#MXD IC: 0.8 X10ˆ3/UL (ref 0.1–1)
BUN BLD-MCNC: 12 MG/DL (ref 7–18)
CHLORIDE BLD-SCNC: 102 MMOL/L (ref 98–112)
CO2 BLD-SCNC: 29 MMOL/L (ref 21–32)
CREAT BLD-MCNC: 0.8 MG/DL
GLUCOSE BLD-MCNC: 110 MG/DL (ref 70–99)
HCT VFR BLD AUTO: 41.4 %
HCT VFR BLD CALC: 46 %
HGB BLD-MCNC: 13.7 G/DL
ISTAT IONIZED CALCIUM FOR CHEM 8: 1.26 MMOL/L (ref 1.12–1.32)
LYMPHOCYTES # BLD AUTO: 3.3 X10ˆ3/UL (ref 1–4)
LYMPHOCYTES NFR BLD AUTO: 36.1 %
MCH RBC QN AUTO: 31.6 PG (ref 26–34)
MCHC RBC AUTO-ENTMCNC: 33.1 G/DL (ref 31–37)
MCV RBC AUTO: 95.4 FL (ref 80–100)
MIXED CELL %: 9.1 %
NEUTROPHILS # BLD AUTO: 5.1 X10ˆ3/UL (ref 1.5–7.7)
NEUTROPHILS NFR BLD AUTO: 54.8 %
PLATELET # BLD AUTO: 264 X10ˆ3/UL (ref 150–450)
POTASSIUM BLD-SCNC: 3.6 MMOL/L (ref 3.6–5.1)
RBC # BLD AUTO: 4.34 X10ˆ6/UL
SODIUM BLD-SCNC: 141 MMOL/L (ref 136–145)
TROPONIN I BLD-MCNC: <0.02 NG/ML
WBC # BLD AUTO: 9.2 X10ˆ3/UL (ref 4–11)

## 2022-05-17 PROCEDURE — 80047 BASIC METABLC PNL IONIZED CA: CPT

## 2022-05-17 PROCEDURE — 99214 OFFICE O/P EST MOD 30 MIN: CPT

## 2022-05-17 PROCEDURE — 93000 ELECTROCARDIOGRAM COMPLETE: CPT

## 2022-05-17 PROCEDURE — 85025 COMPLETE CBC W/AUTO DIFF WBC: CPT

## 2022-05-17 PROCEDURE — 36415 COLL VENOUS BLD VENIPUNCTURE: CPT

## 2022-05-17 PROCEDURE — 84484 ASSAY OF TROPONIN QUANT: CPT

## 2022-05-17 PROCEDURE — 71046 X-RAY EXAM CHEST 2 VIEWS: CPT

## 2022-05-17 NOTE — ED INITIAL ASSESSMENT (HPI)
Patient presents a&o4/4 with complaints of palpitations. Endorses feelings of anxiety. X 1 week.  Denies fever/chill/cough. + SOB at rest and upon ambulation

## 2022-07-26 ENCOUNTER — TELEPHONE (OUTPATIENT)
Dept: OPHTHALMOLOGY | Facility: CLINIC | Age: 22
End: 2022-07-26

## 2022-07-26 ENCOUNTER — HOSPITAL ENCOUNTER (OUTPATIENT)
Age: 22
Discharge: HOME | End: 2022-07-26
Payer: COMMERCIAL

## 2022-07-26 VITALS
RESPIRATION RATE: 18 BRPM | DIASTOLIC BLOOD PRESSURE: 78 MMHG | TEMPERATURE: 98 F | HEART RATE: 71 BPM | SYSTOLIC BLOOD PRESSURE: 133 MMHG | OXYGEN SATURATION: 100 %

## 2022-07-26 DIAGNOSIS — Z20.822 ENCOUNTER FOR LABORATORY TESTING FOR COVID-19 VIRUS: Primary | ICD-10-CM

## 2022-07-26 DIAGNOSIS — S05.01XA ABRASION OF RIGHT CORNEA, INITIAL ENCOUNTER: ICD-10-CM

## 2022-07-26 LAB
S PYO AG THROAT QL: NEGATIVE
SARS-COV-2 RNA RESP QL NAA+PROBE: NOT DETECTED

## 2022-07-26 PROCEDURE — U0002 COVID-19 LAB TEST NON-CDC: HCPCS | Performed by: NURSE PRACTITIONER

## 2022-07-26 PROCEDURE — 87880 STREP A ASSAY W/OPTIC: CPT | Performed by: NURSE PRACTITIONER

## 2022-07-26 PROCEDURE — 99213 OFFICE O/P EST LOW 20 MIN: CPT | Performed by: NURSE PRACTITIONER

## 2022-07-26 RX ORDER — TETRACAINE HYDROCHLORIDE 5 MG/ML
2 SOLUTION OPHTHALMIC ONCE
Status: COMPLETED | OUTPATIENT
Start: 2022-07-26 | End: 2022-07-26

## 2022-07-26 RX ORDER — TOBRAMYCIN AND DEXAMETHASONE 3; 1 MG/ML; MG/ML
2 SUSPENSION/ DROPS OPHTHALMIC
Qty: 5 ML | Refills: 0 | Status: SHIPPED | OUTPATIENT
Start: 2022-07-26 | End: 2022-07-31

## 2022-07-26 NOTE — ED INITIAL ASSESSMENT (HPI)
Patient presents a&o 4/4 with c/o eye swelling, redness. Denies drainage/discharge. States throat began itching this morning. \"It's hard to swallow\" States felt numbness on lips. NAD, respirations even and nonlabored.  Managing secretions fine

## 2022-09-13 NOTE — TELEPHONE ENCOUNTER
From: Francisco Walker  To:  Lindy Dunne DO  Sent: 2/8/2020 12:17 AM CST  Subject: Test Results Question    I have a question about CT Scan Head or Neck resulted on 2/5/20, 2:37 PM.    Overall from viewing my results, will a septoplasty still be Detail Level: Generalized Body Of Note (Please Add Your Own Text Here): monitor annually Price (Do Not Change): 0.00 Instructions: This plan will send the code FBSE to the PM system.  DO NOT or CHANGE the price.

## 2022-10-05 ENCOUNTER — OFFICE VISIT (OUTPATIENT)
Dept: FAMILY MEDICINE CLINIC | Facility: CLINIC | Age: 22
End: 2022-10-05
Payer: COMMERCIAL

## 2022-10-05 VITALS
BODY MASS INDEX: 29.44 KG/M2 | WEIGHT: 160 LBS | DIASTOLIC BLOOD PRESSURE: 69 MMHG | HEART RATE: 82 BPM | SYSTOLIC BLOOD PRESSURE: 125 MMHG | TEMPERATURE: 98 F | RESPIRATION RATE: 18 BRPM | HEIGHT: 61.8 IN

## 2022-10-05 DIAGNOSIS — Z00.00 ROUTINE PHYSICAL EXAMINATION: Primary | ICD-10-CM

## 2022-10-05 DIAGNOSIS — R00.2 PALPITATION: ICD-10-CM

## 2022-10-05 PROCEDURE — 3008F BODY MASS INDEX DOCD: CPT | Performed by: FAMILY MEDICINE

## 2022-10-05 PROCEDURE — 3074F SYST BP LT 130 MM HG: CPT | Performed by: FAMILY MEDICINE

## 2022-10-05 PROCEDURE — 3078F DIAST BP <80 MM HG: CPT | Performed by: FAMILY MEDICINE

## 2022-10-05 PROCEDURE — 99395 PREV VISIT EST AGE 18-39: CPT | Performed by: FAMILY MEDICINE

## 2022-10-05 NOTE — PROGRESS NOTES
Subjective:   Patient ID: Jacobo Alonso is a 20year old female. Patient is here for routine physical exam. No acute issues. No significant chronic medical problems. Patient is requesting testing. Diet and exercise have been good but gained some weight. Pt also noticed some palpitations at times. No sig chest pains. Pt did go to immediate care and had normal work up and EKG. Past medical history, family history, and social history were reviewed. Family hx of thyroid disease and DM. No COVID hx.      History/Other:   Review of Systems   Constitutional: Negative. Negative for fever. HENT: Negative. Negative for congestion. Eyes: Negative. Respiratory: Positive for chest tightness. Cardiovascular: Positive for palpitations. Negative for chest pain. Gastrointestinal: Negative. Negative for abdominal pain. Genitourinary: Negative. Negative for difficulty urinating and dysuria. Musculoskeletal: Negative. Skin: Negative. Neurological: Negative. Psychiatric/Behavioral: Negative. Negative for dysphoric mood. The patient is not nervous/anxious. No current outpatient medications on file. Allergies:No Known Allergies    Objective:   Physical Exam  Constitutional:       Appearance: Normal appearance. She is well-developed. HENT:      Head: Normocephalic and atraumatic. Right Ear: Tympanic membrane and external ear normal.      Left Ear: Tympanic membrane and external ear normal.      Nose: Nose normal.      Mouth/Throat:      Mouth: Mucous membranes are moist.      Pharynx: No posterior oropharyngeal erythema. Eyes:      Conjunctiva/sclera: Conjunctivae normal.   Neck:      Thyroid: No thyroid mass, thyromegaly or thyroid tenderness. Cardiovascular:      Rate and Rhythm: Normal rate and regular rhythm. Pulses: Normal pulses. Heart sounds: Normal heart sounds. No murmur heard.   Pulmonary:      Effort: Pulmonary effort is normal.      Breath sounds: Normal breath sounds. Abdominal:      General: Abdomen is flat. There is no distension. Palpations: Abdomen is soft. Tenderness: There is no abdominal tenderness. There is no guarding or rebound. Genitourinary:     Vagina: Normal.   Musculoskeletal:         General: Normal range of motion. Cervical back: Normal range of motion and neck supple. Lymphadenopathy:      Cervical: No cervical adenopathy. Skin:     General: Skin is warm. Neurological:      General: No focal deficit present. Mental Status: She is alert and oriented to person, place, and time. Deep Tendon Reflexes: Reflexes are normal and symmetric. Reflexes normal.   Psychiatric:         Mood and Affect: Mood normal.         Behavior: Behavior normal.         Thought Content: Thought content normal.         Judgment: Judgment normal.         Assessment & Plan:   Routine physical examination:  - Exam is unremarkable. Screening tests were discussed, and after discussion, will check lab work as below. Healthy diet, exercise, and weight were discussed. To call if problems and follow up and further management after testing. Routine follow up. Palpitation:  - After discussion with patient, to monitor for symptoms and call if any significant symptoms; will check blood work; Follow up and further management after testing. Consider follow up with cardiology pending results. Orders Placed This Encounter      Assay, Thyroid Stim Hormone      Lipid Panel      Comp Metabolic Panel (14)      CBC, Platelet;  No Differential      T4 (Thyroxine Total)      Hemoglobin A1C      Vitamin D      Meds This Visit:  Requested Prescriptions      No prescriptions requested or ordered in this encounter       Imaging & Referrals:  CARDIO - INTERNAL

## 2022-10-06 ENCOUNTER — LAB ENCOUNTER (OUTPATIENT)
Dept: LAB | Age: 22
End: 2022-10-06
Attending: FAMILY MEDICINE
Payer: COMMERCIAL

## 2022-10-06 DIAGNOSIS — Z00.00 ROUTINE PHYSICAL EXAMINATION: ICD-10-CM

## 2022-10-06 LAB
ALBUMIN SERPL-MCNC: 4 G/DL (ref 3.4–5)
ALBUMIN/GLOB SERPL: 1 {RATIO} (ref 1–2)
ALP LIVER SERPL-CCNC: 56 U/L
ALT SERPL-CCNC: 17 U/L
ANION GAP SERPL CALC-SCNC: 7 MMOL/L (ref 0–18)
AST SERPL-CCNC: 16 U/L (ref 15–37)
BILIRUB SERPL-MCNC: 0.8 MG/DL (ref 0.1–2)
BUN BLD-MCNC: 15 MG/DL (ref 7–18)
BUN/CREAT SERPL: 18.3 (ref 10–20)
CALCIUM BLD-MCNC: 9.5 MG/DL (ref 8.5–10.1)
CHLORIDE SERPL-SCNC: 106 MMOL/L (ref 98–112)
CHOLEST SERPL-MCNC: 149 MG/DL (ref ?–200)
CO2 SERPL-SCNC: 26 MMOL/L (ref 21–32)
CREAT BLD-MCNC: 0.82 MG/DL
DEPRECATED RDW RBC AUTO: 46.6 FL (ref 35.1–46.3)
ERYTHROCYTE [DISTWIDTH] IN BLOOD BY AUTOMATED COUNT: 12.8 % (ref 11–15)
EST. AVERAGE GLUCOSE BLD GHB EST-MCNC: 105 MG/DL (ref 68–126)
FASTING PATIENT LIPID ANSWER: YES
FASTING STATUS PATIENT QL REPORTED: YES
GFR SERPLBLD BASED ON 1.73 SQ M-ARVRAT: 103 ML/MIN/1.73M2 (ref 60–?)
GLOBULIN PLAS-MCNC: 4.1 G/DL (ref 2.8–4.4)
GLUCOSE BLD-MCNC: 84 MG/DL (ref 70–99)
HBA1C MFR BLD: 5.3 % (ref ?–5.7)
HCT VFR BLD AUTO: 40.4 %
HDLC SERPL-MCNC: 78 MG/DL (ref 40–59)
HGB BLD-MCNC: 13.2 G/DL
LDLC SERPL CALC-MCNC: 59 MG/DL (ref ?–100)
MCH RBC QN AUTO: 32.4 PG (ref 26–34)
MCHC RBC AUTO-ENTMCNC: 32.7 G/DL (ref 31–37)
MCV RBC AUTO: 99.3 FL
NONHDLC SERPL-MCNC: 71 MG/DL (ref ?–130)
OSMOLALITY SERPL CALC.SUM OF ELEC: 288 MOSM/KG (ref 275–295)
PLATELET # BLD AUTO: 240 10(3)UL (ref 150–450)
POTASSIUM SERPL-SCNC: 3.8 MMOL/L (ref 3.5–5.1)
PROT SERPL-MCNC: 8.1 G/DL (ref 6.4–8.2)
RBC # BLD AUTO: 4.07 X10(6)UL
SODIUM SERPL-SCNC: 139 MMOL/L (ref 136–145)
T4 SERPL-MCNC: 10.9 UG/DL
TRIGL SERPL-MCNC: 58 MG/DL (ref 30–149)
TSI SER-ACNC: 2.4 MIU/ML (ref 0.36–3.74)
VIT D+METAB SERPL-MCNC: 27.8 NG/ML (ref 30–100)
VLDLC SERPL CALC-MCNC: 8 MG/DL (ref 0–30)
WBC # BLD AUTO: 7 X10(3) UL (ref 4–11)

## 2022-10-06 PROCEDURE — 36415 COLL VENOUS BLD VENIPUNCTURE: CPT

## 2022-10-06 PROCEDURE — 82306 VITAMIN D 25 HYDROXY: CPT

## 2022-10-06 PROCEDURE — 83036 HEMOGLOBIN GLYCOSYLATED A1C: CPT

## 2022-10-06 PROCEDURE — 80053 COMPREHEN METABOLIC PANEL: CPT

## 2022-10-06 PROCEDURE — 85027 COMPLETE CBC AUTOMATED: CPT

## 2022-10-06 PROCEDURE — 84443 ASSAY THYROID STIM HORMONE: CPT

## 2022-10-06 PROCEDURE — 80061 LIPID PANEL: CPT

## 2022-10-06 PROCEDURE — 84436 ASSAY OF TOTAL THYROXINE: CPT

## 2023-01-10 ENCOUNTER — OFFICE VISIT (OUTPATIENT)
Dept: OBGYN CLINIC | Facility: CLINIC | Age: 23
End: 2023-01-10
Payer: COMMERCIAL

## 2023-01-10 VITALS — WEIGHT: 165.81 LBS | SYSTOLIC BLOOD PRESSURE: 112 MMHG | BODY MASS INDEX: 31 KG/M2 | DIASTOLIC BLOOD PRESSURE: 74 MMHG

## 2023-01-10 DIAGNOSIS — N89.8 VAGINAL DISCHARGE: ICD-10-CM

## 2023-01-10 DIAGNOSIS — Z12.4 PAP SMEAR FOR CERVICAL CANCER SCREENING: ICD-10-CM

## 2023-01-10 DIAGNOSIS — Z11.3 SCREENING EXAMINATION FOR STD (SEXUALLY TRANSMITTED DISEASE): ICD-10-CM

## 2023-01-10 DIAGNOSIS — N90.89 VULVAL LESION: Primary | ICD-10-CM

## 2023-01-10 PROCEDURE — 3078F DIAST BP <80 MM HG: CPT | Performed by: OBSTETRICS & GYNECOLOGY

## 2023-01-10 PROCEDURE — 3074F SYST BP LT 130 MM HG: CPT | Performed by: OBSTETRICS & GYNECOLOGY

## 2023-01-10 PROCEDURE — 99203 OFFICE O/P NEW LOW 30 MIN: CPT | Performed by: OBSTETRICS & GYNECOLOGY

## 2023-01-11 ENCOUNTER — TELEPHONE (OUTPATIENT)
Dept: OBGYN CLINIC | Facility: CLINIC | Age: 23
End: 2023-01-11

## 2023-01-11 LAB
C TRACH DNA SPEC QL NAA+PROBE: POSITIVE
N GONORRHOEA DNA SPEC QL NAA+PROBE: NEGATIVE

## 2023-01-11 RX ORDER — DOXYCYCLINE HYCLATE 100 MG/1
100 CAPSULE ORAL 2 TIMES DAILY
Qty: 14 CAPSULE | Refills: 0 | Status: SHIPPED | OUTPATIENT
Start: 2023-01-11 | End: 2023-01-18

## 2023-01-11 NOTE — TELEPHONE ENCOUNTER
Patient name and  verified. Patient informed of positive Chlamydia lab results. Doxycyline 100 mg x 7 days sent to pharmacy. Patient declined STD panel. Aware DANIEL she should be completed in 3 months. Patient added to recall book. STI form generated and faxed to [facility].

## 2023-01-12 ENCOUNTER — TELEPHONE (OUTPATIENT)
Dept: OBGYN CLINIC | Facility: CLINIC | Age: 23
End: 2023-01-12

## 2023-01-12 LAB
GENITAL VAGINOSIS SCREEN: NEGATIVE
TRICHOMONAS SCREEN: NEGATIVE

## 2023-01-17 ENCOUNTER — TELEPHONE (OUTPATIENT)
Dept: OBGYN CLINIC | Facility: CLINIC | Age: 23
End: 2023-01-17

## 2023-01-17 ENCOUNTER — PATIENT MESSAGE (OUTPATIENT)
Dept: OBGYN CLINIC | Facility: CLINIC | Age: 23
End: 2023-01-17

## 2023-01-17 NOTE — TELEPHONE ENCOUNTER
Pt stating pap results came back abnormal.  Pt would like to speak with someone about 4pm    Pls advise

## 2023-01-23 ENCOUNTER — TELEPHONE (OUTPATIENT)
Dept: OBGYN CLINIC | Facility: CLINIC | Age: 23
End: 2023-01-23

## 2023-01-23 NOTE — TELEPHONE ENCOUNTER
Pt was recently treated for yeast infection and chlamydia. Pt states pelvic discomfort has decreased significantly. Finished teraconazole cream last Wednesday and finished period on Thursday. Last night noticed brownish, pinkish discharge. Pt advised that discharge could be due to her recent period and recent use of vaginal cream. Pt denies any additional symptoms at this time. Pt to continue to monitor and will call for any worsening pelvic discomfort or symptoms of yeast infection.  VU.

## 2023-03-19 ENCOUNTER — IMMUNIZATION (OUTPATIENT)
Dept: LAB | Age: 23
End: 2023-03-19
Attending: EMERGENCY MEDICINE
Payer: COMMERCIAL

## 2023-03-19 DIAGNOSIS — Z23 NEED FOR VACCINATION: Primary | ICD-10-CM

## 2023-03-19 PROCEDURE — 0134A SARSCOV2 VAC BVL 50MCG/0.5ML: CPT

## 2023-04-06 ENCOUNTER — OFFICE VISIT (OUTPATIENT)
Dept: OBGYN CLINIC | Facility: CLINIC | Age: 23
End: 2023-04-06

## 2023-04-06 VITALS — BODY MASS INDEX: 31 KG/M2 | WEIGHT: 166.63 LBS

## 2023-04-06 DIAGNOSIS — Z01.419 ENCOUNTER FOR WELL WOMAN EXAM WITH ROUTINE GYNECOLOGICAL EXAM: Primary | ICD-10-CM

## 2023-04-06 DIAGNOSIS — Z11.3 ROUTINE SCREENING FOR STI (SEXUALLY TRANSMITTED INFECTION): ICD-10-CM

## 2023-04-06 PROCEDURE — 99213 OFFICE O/P EST LOW 20 MIN: CPT | Performed by: OBSTETRICS & GYNECOLOGY

## 2023-04-06 RX ORDER — DIPHENOXYLATE HYDROCHLORIDE AND ATROPINE SULFATE 2.5; .025 MG/1; MG/1
1 TABLET ORAL
COMMUNITY

## 2023-04-07 LAB
C TRACH DNA SPEC QL NAA+PROBE: NEGATIVE
N GONORRHOEA DNA SPEC QL NAA+PROBE: NEGATIVE

## 2023-04-17 ENCOUNTER — TELEPHONE (OUTPATIENT)
Dept: PEDIATRICS CLINIC | Facility: CLINIC | Age: 23
End: 2023-04-17

## 2023-04-17 NOTE — TELEPHONE ENCOUNTER
Looking for pap results, did not see results via [address]  Please call patient between 4-4:30

## 2023-04-25 ENCOUNTER — TELEPHONE (OUTPATIENT)
Dept: OBGYN CLINIC | Facility: CLINIC | Age: 23
End: 2023-04-25

## 2023-04-25 NOTE — TELEPHONE ENCOUNTER
Pt called and informed of results and recommendations. Pt voices understanding. Pt placed in follow up book.

## 2023-04-25 NOTE — TELEPHONE ENCOUNTER
----- Message from Ang Hall MD sent at 4/22/2023  8:58 AM CDT -----  Please inform of Pap test showing mild changes, stable like her previous one (low grade OMAR). The recommendation for those under 22years old is to repeat the test in 12 months. I recommend she get the Gardasil vaccine to boost her protection and condom use for STD prevention.

## 2023-06-22 ENCOUNTER — HOSPITAL ENCOUNTER (OUTPATIENT)
Age: 23
Discharge: HOME | End: 2023-06-22
Payer: COMMERCIAL

## 2023-06-22 ENCOUNTER — OFFICE VISIT (OUTPATIENT)
Dept: FAMILY MEDICINE CLINIC | Facility: CLINIC | Age: 23
End: 2023-06-22

## 2023-06-22 VITALS
DIASTOLIC BLOOD PRESSURE: 78 MMHG | WEIGHT: 168 LBS | HEIGHT: 61.8 IN | HEART RATE: 97 BPM | SYSTOLIC BLOOD PRESSURE: 118 MMHG | BODY MASS INDEX: 30.91 KG/M2

## 2023-06-22 VITALS
DIASTOLIC BLOOD PRESSURE: 95 MMHG | SYSTOLIC BLOOD PRESSURE: 154 MMHG | HEART RATE: 105 BPM | RESPIRATION RATE: 16 BRPM | OXYGEN SATURATION: 100 % | TEMPERATURE: 98 F

## 2023-06-22 DIAGNOSIS — R03.0 ELEVATED BLOOD PRESSURE READING: Primary | ICD-10-CM

## 2023-06-22 DIAGNOSIS — Z20.822 ENCOUNTER FOR LABORATORY TESTING FOR COVID-19 VIRUS: ICD-10-CM

## 2023-06-22 DIAGNOSIS — J02.9 VIRAL PHARYNGITIS: Primary | ICD-10-CM

## 2023-06-22 LAB
S PYO AG THROAT QL: NEGATIVE
SARS-COV-2 RNA RESP QL NAA+PROBE: NOT DETECTED

## 2023-06-22 PROCEDURE — 3008F BODY MASS INDEX DOCD: CPT | Performed by: FAMILY MEDICINE

## 2023-06-22 PROCEDURE — U0002 COVID-19 LAB TEST NON-CDC: HCPCS | Performed by: NURSE PRACTITIONER

## 2023-06-22 PROCEDURE — 99213 OFFICE O/P EST LOW 20 MIN: CPT | Performed by: FAMILY MEDICINE

## 2023-06-22 PROCEDURE — 3074F SYST BP LT 130 MM HG: CPT | Performed by: FAMILY MEDICINE

## 2023-06-22 PROCEDURE — 3078F DIAST BP <80 MM HG: CPT | Performed by: FAMILY MEDICINE

## 2023-06-22 PROCEDURE — 87880 STREP A ASSAY W/OPTIC: CPT | Performed by: NURSE PRACTITIONER

## 2023-06-22 PROCEDURE — 99213 OFFICE O/P EST LOW 20 MIN: CPT | Performed by: NURSE PRACTITIONER

## 2023-06-22 NOTE — ED INITIAL ASSESSMENT (HPI)
Pt in [address] [place] [address] for c/o sore throat, pain with swallowing and runny nose x Tuesday. No fever, no cough.

## 2023-06-22 NOTE — PROGRESS NOTES
Subjective:   Patient ID: Jacobo Alonso is a 20year old female. HPI  Patient presents with:  Urgent Care F/u: 6/22  Blood Pressure: Elevated reading at [facility]. Has been getting high readings at home 140-150s     History/Other:   Review of Systems   Constitutional: Negative. HENT: Positive for sore throat. Cardiovascular: Positive for palpitations. No current outpatient medications on file. Allergies:No Known Allergies    Objective:   Physical Exam  Vitals reviewed. HENT:      Mouth/Throat:      Pharynx: Posterior oropharyngeal erythema present. Cardiovascular:      Rate and Rhythm: Normal rate and regular rhythm. Heart sounds: Normal heart sounds. Pulmonary:      Breath sounds: Normal breath sounds. Lymphadenopathy:      Cervical: No cervical adenopathy. 06/22/23  1123   BP: 118/78   Pulse: 97     Assessment & Plan:   Elevated blood pressure reading  (primary encounter diagnosis)    Normal BP taken twice here. May be anxiety induced rise in BP. Her physical exam otherwise normal.  Discussed sleep maitenance and taking vitamin D 2000 daily. Follow up for her annual in October. No orders of the defined types were placed in this encounter.       Meds This Visit:  Requested Prescriptions      No prescriptions requested or ordered in this encounter       Imaging & Referrals:  None

## 2023-07-31 ENCOUNTER — OFFICE VISIT (OUTPATIENT)
Dept: FAMILY MEDICINE CLINIC | Facility: CLINIC | Age: 23
End: 2023-07-31

## 2023-07-31 VITALS
BODY MASS INDEX: 31.47 KG/M2 | SYSTOLIC BLOOD PRESSURE: 117 MMHG | HEART RATE: 94 BPM | WEIGHT: 171 LBS | DIASTOLIC BLOOD PRESSURE: 72 MMHG | RESPIRATION RATE: 18 BRPM | HEIGHT: 61.8 IN | TEMPERATURE: 99 F

## 2023-07-31 DIAGNOSIS — R30.0 BURNING WITH URINATION: Primary | ICD-10-CM

## 2023-07-31 LAB
BILIRUBIN: NEGATIVE
GLUCOSE (URINE DIPSTICK): NEGATIVE MG/DL
KETONES (URINE DIPSTICK): NEGATIVE MG/DL
MULTISTIX EXPIRATION DATE: ABNORMAL DATE
MULTISTIX LOT#: ABNORMAL NUMERIC
NITRITE, URINE: NEGATIVE
OCCULT BLOOD: NEGATIVE
PH, URINE: 5 (ref 4.5–8)
PROTEIN (URINE DIPSTICK): NEGATIVE MG/DL
SPECIFIC GRAVITY: 1.03 (ref 1–1.03)
URINE-COLOR: CLEAR
UROBILINOGEN,SEMI-QN: 0.2 MG/DL (ref 0–1.9)

## 2023-07-31 PROCEDURE — 81002 URINALYSIS NONAUTO W/O SCOPE: CPT | Performed by: FAMILY MEDICINE

## 2023-07-31 PROCEDURE — 3078F DIAST BP <80 MM HG: CPT | Performed by: FAMILY MEDICINE

## 2023-07-31 PROCEDURE — 3008F BODY MASS INDEX DOCD: CPT | Performed by: FAMILY MEDICINE

## 2023-07-31 PROCEDURE — 3074F SYST BP LT 130 MM HG: CPT | Performed by: FAMILY MEDICINE

## 2023-07-31 PROCEDURE — 99213 OFFICE O/P EST LOW 20 MIN: CPT | Performed by: FAMILY MEDICINE

## 2023-07-31 RX ORDER — NITROFURANTOIN 25; 75 MG/1; MG/1
100 CAPSULE ORAL 2 TIMES DAILY
Qty: 14 CAPSULE | Refills: 0 | Status: SHIPPED | OUTPATIENT
Start: 2023-07-31

## 2023-07-31 NOTE — PROGRESS NOTES
Subjective:   Patient ID: Shayla Funes is a 20year old female. Pt has had UTI symptoms for a 1-2 weeks. Burning with urination with frequency and urgency. No fevers or flank pain/ back pains. No GI symptoms. Pt denies any discharge or other sig symptoms but would like to check GC/ Chlamydia also. Did have sexual encounter in April. Did use protection. History/Other:   Review of Systems   Constitutional:  Negative for fever. Genitourinary:  Positive for dysuria. Negative for frequency, hematuria, urgency and vaginal discharge. Current Outpatient Medications   Medication Sig Dispense Refill    nitrofurantoin monohydrate macro (MACROBID) 100 MG Oral Cap Take 1 capsule (100 mg total) by mouth 2 (two) times daily. 14 capsule 0     Allergies:No Known Allergies    Objective:   Physical Exam  Vitals reviewed. Constitutional:       Appearance: Normal appearance. She is well-developed. Abdominal:      General: There is no distension. Palpations: Abdomen is soft. Tenderness: There is no abdominal tenderness. There is no rebound. Neurological:      Mental Status: She is alert. Psychiatric:         Mood and Affect: Mood normal.         Behavior: Behavior normal.         Assessment & Plan:   Burning with urination:  - Urinalysis suggestive of UTI: After discussion with patient, macrobid as directed; To call if worse or not better. Encouraged fluids and discussed cranberry juice. Follow up in one week if not resolved. Urine culture and GC/ chlamydia was also sent and will follow up after results received. Orders Placed This Encounter      POC Urinalysis, Manual Dip without microscopy [[id number]]      Meds This Visit:  Requested Prescriptions     Signed Prescriptions Disp Refills    nitrofurantoin monohydrate macro (MACROBID) 100 MG Oral Cap 14 capsule 0     Sig: Take 1 capsule (100 mg total) by mouth 2 (two) times daily.        Imaging & Referrals:  None

## 2023-08-01 LAB
C TRACH DNA SPEC QL NAA+PROBE: NEGATIVE
N GONORRHOEA DNA SPEC QL NAA+PROBE: NEGATIVE

## 2023-11-01 ENCOUNTER — LAB ENCOUNTER (OUTPATIENT)
Dept: LAB | Age: 23
End: 2023-11-01
Attending: STUDENT IN AN ORGANIZED HEALTH CARE EDUCATION/TRAINING PROGRAM
Payer: COMMERCIAL

## 2023-11-01 ENCOUNTER — OFFICE VISIT (OUTPATIENT)
Dept: FAMILY MEDICINE CLINIC | Facility: CLINIC | Age: 23
End: 2023-11-01

## 2023-11-01 VITALS
OXYGEN SATURATION: 98 % | TEMPERATURE: 99 F | WEIGHT: 175.19 LBS | SYSTOLIC BLOOD PRESSURE: 117 MMHG | HEART RATE: 83 BPM | BODY MASS INDEX: 33.07 KG/M2 | DIASTOLIC BLOOD PRESSURE: 74 MMHG | HEIGHT: 61 IN

## 2023-11-01 DIAGNOSIS — Z23 IMMUNIZATION DUE: ICD-10-CM

## 2023-11-01 DIAGNOSIS — N94.6 DYSMENORRHEA: ICD-10-CM

## 2023-11-01 DIAGNOSIS — Z00.00 WELL ADULT EXAM: Primary | ICD-10-CM

## 2023-11-01 DIAGNOSIS — Z00.00 WELL ADULT EXAM: ICD-10-CM

## 2023-11-01 LAB
DEPRECATED RDW RBC AUTO: 43.7 FL (ref 35.1–46.3)
ERYTHROCYTE [DISTWIDTH] IN BLOOD BY AUTOMATED COUNT: 12.1 % (ref 11–15)
EST. AVERAGE GLUCOSE BLD GHB EST-MCNC: 114 MG/DL (ref 68–126)
HBA1C MFR BLD: 5.6 % (ref ?–5.7)
HCT VFR BLD AUTO: 42.4 %
HGB BLD-MCNC: 14.3 G/DL
MCH RBC QN AUTO: 32.9 PG (ref 26–34)
MCHC RBC AUTO-ENTMCNC: 33.7 G/DL (ref 31–37)
MCV RBC AUTO: 97.7 FL
PLATELET # BLD AUTO: 262 10(3)UL (ref 150–450)
RBC # BLD AUTO: 4.34 X10(6)UL
VIT B12 SERPL-MCNC: 602 PG/ML (ref 211–911)
VIT D+METAB SERPL-MCNC: 36.6 NG/ML (ref 30–100)
WBC # BLD AUTO: 8.3 X10(3) UL (ref 4–11)

## 2023-11-01 PROCEDURE — 3074F SYST BP LT 130 MM HG: CPT | Performed by: STUDENT IN AN ORGANIZED HEALTH CARE EDUCATION/TRAINING PROGRAM

## 2023-11-01 PROCEDURE — 82607 VITAMIN B-12: CPT

## 2023-11-01 PROCEDURE — 99395 PREV VISIT EST AGE 18-39: CPT | Performed by: STUDENT IN AN ORGANIZED HEALTH CARE EDUCATION/TRAINING PROGRAM

## 2023-11-01 PROCEDURE — 82306 VITAMIN D 25 HYDROXY: CPT

## 2023-11-01 PROCEDURE — 80053 COMPREHEN METABOLIC PANEL: CPT

## 2023-11-01 PROCEDURE — 84466 ASSAY OF TRANSFERRIN: CPT

## 2023-11-01 PROCEDURE — 80061 LIPID PANEL: CPT

## 2023-11-01 PROCEDURE — 85027 COMPLETE CBC AUTOMATED: CPT

## 2023-11-01 PROCEDURE — 83036 HEMOGLOBIN GLYCOSYLATED A1C: CPT

## 2023-11-01 PROCEDURE — 83540 ASSAY OF IRON: CPT

## 2023-11-01 PROCEDURE — 36415 COLL VENOUS BLD VENIPUNCTURE: CPT

## 2023-11-01 PROCEDURE — 90686 IIV4 VACC NO PRSV 0.5 ML IM: CPT | Performed by: STUDENT IN AN ORGANIZED HEALTH CARE EDUCATION/TRAINING PROGRAM

## 2023-11-01 PROCEDURE — 3078F DIAST BP <80 MM HG: CPT | Performed by: STUDENT IN AN ORGANIZED HEALTH CARE EDUCATION/TRAINING PROGRAM

## 2023-11-01 PROCEDURE — 90471 IMMUNIZATION ADMIN: CPT | Performed by: STUDENT IN AN ORGANIZED HEALTH CARE EDUCATION/TRAINING PROGRAM

## 2023-11-01 PROCEDURE — 84443 ASSAY THYROID STIM HORMONE: CPT

## 2023-11-01 PROCEDURE — 82728 ASSAY OF FERRITIN: CPT

## 2023-11-01 PROCEDURE — 3008F BODY MASS INDEX DOCD: CPT | Performed by: STUDENT IN AN ORGANIZED HEALTH CARE EDUCATION/TRAINING PROGRAM

## 2023-11-02 LAB
ALBUMIN SERPL-MCNC: 4.6 G/DL (ref 3.2–4.8)
ALBUMIN/GLOB SERPL: 1.4 {RATIO} (ref 1–2)
ALP LIVER SERPL-CCNC: 53 U/L
ALT SERPL-CCNC: 11 U/L
ANION GAP SERPL CALC-SCNC: 7 MMOL/L (ref 0–18)
AST SERPL-CCNC: 18 U/L (ref ?–34)
BILIRUB SERPL-MCNC: 0.7 MG/DL (ref 0.3–1.2)
BUN BLD-MCNC: 12 MG/DL (ref 9–23)
BUN/CREAT SERPL: 12.9 (ref 10–20)
CALCIUM BLD-MCNC: 9.7 MG/DL (ref 8.7–10.4)
CHLORIDE SERPL-SCNC: 101 MMOL/L (ref 98–112)
CHOLEST SERPL-MCNC: 166 MG/DL (ref ?–200)
CO2 SERPL-SCNC: 29 MMOL/L (ref 21–32)
CREAT BLD-MCNC: 0.93 MG/DL
DEPRECATED HBV CORE AB SER IA-ACNC: 23.8 NG/ML
EGFRCR SERPLBLD CKD-EPI 2021: 88 ML/MIN/1.73M2 (ref 60–?)
FASTING PATIENT LIPID ANSWER: YES
FASTING STATUS PATIENT QL REPORTED: YES
GLOBULIN PLAS-MCNC: 3.2 G/DL (ref 2.8–4.4)
GLUCOSE BLD-MCNC: 101 MG/DL (ref 70–99)
HDLC SERPL-MCNC: 70 MG/DL (ref 40–59)
IRON SATN MFR SERPL: 50 %
IRON SERPL-MCNC: 182 UG/DL
LDLC SERPL CALC-MCNC: 82 MG/DL (ref ?–100)
NONHDLC SERPL-MCNC: 96 MG/DL (ref ?–130)
OSMOLALITY SERPL CALC.SUM OF ELEC: 284 MOSM/KG (ref 275–295)
POTASSIUM SERPL-SCNC: 4 MMOL/L (ref 3.5–5.1)
PROT SERPL-MCNC: 7.8 G/DL (ref 5.7–8.2)
SODIUM SERPL-SCNC: 137 MMOL/L (ref 136–145)
TIBC SERPL-MCNC: 361 UG/DL (ref 250–425)
TRANSFERRIN SERPL-MCNC: 242 MG/DL (ref 250–380)
TRIGL SERPL-MCNC: 72 MG/DL (ref 30–149)
TSI SER-ACNC: 2.28 MIU/ML (ref 0.55–4.78)
VLDLC SERPL CALC-MCNC: 11 MG/DL (ref 0–30)

## 2023-11-25 ENCOUNTER — LAB SERVICES (OUTPATIENT)
Dept: LAB | Age: 23
End: 2023-11-25

## 2024-01-27 ENCOUNTER — NURSE ONLY (OUTPATIENT)
Dept: FAMILY MEDICINE CLINIC | Facility: CLINIC | Age: 24
End: 2024-01-27
Payer: COMMERCIAL

## 2024-01-27 DIAGNOSIS — Z23 IMMUNIZATION DUE: Primary | ICD-10-CM

## 2024-01-27 PROCEDURE — 90471 IMMUNIZATION ADMIN: CPT | Performed by: STUDENT IN AN ORGANIZED HEALTH CARE EDUCATION/TRAINING PROGRAM

## 2024-01-27 PROCEDURE — 90651 9VHPV VACCINE 2/3 DOSE IM: CPT | Performed by: STUDENT IN AN ORGANIZED HEALTH CARE EDUCATION/TRAINING PROGRAM

## 2024-03-05 ENCOUNTER — OFFICE VISIT (OUTPATIENT)
Dept: FAMILY MEDICINE CLINIC | Facility: CLINIC | Age: 24
End: 2024-03-05

## 2024-03-05 ENCOUNTER — LAB ENCOUNTER (OUTPATIENT)
Dept: LAB | Age: 24
End: 2024-03-05
Attending: FAMILY MEDICINE
Payer: COMMERCIAL

## 2024-03-05 VITALS
SYSTOLIC BLOOD PRESSURE: 135 MMHG | HEART RATE: 111 BPM | TEMPERATURE: 98 F | BODY MASS INDEX: 31.15 KG/M2 | WEIGHT: 165 LBS | RESPIRATION RATE: 20 BRPM | DIASTOLIC BLOOD PRESSURE: 73 MMHG | HEIGHT: 61 IN

## 2024-03-05 DIAGNOSIS — Z11.3 ROUTINE SCREENING FOR STI (SEXUALLY TRANSMITTED INFECTION): ICD-10-CM

## 2024-03-05 DIAGNOSIS — H43.393 VITREOUS FLOATERS OF BOTH EYES: ICD-10-CM

## 2024-03-05 DIAGNOSIS — R00.2 PALPITATION: Primary | ICD-10-CM

## 2024-03-05 PROCEDURE — 3008F BODY MASS INDEX DOCD: CPT | Performed by: FAMILY MEDICINE

## 2024-03-05 PROCEDURE — 99213 OFFICE O/P EST LOW 20 MIN: CPT | Performed by: FAMILY MEDICINE

## 2024-03-05 PROCEDURE — 3075F SYST BP GE 130 - 139MM HG: CPT | Performed by: FAMILY MEDICINE

## 2024-03-05 PROCEDURE — 3078F DIAST BP <80 MM HG: CPT | Performed by: FAMILY MEDICINE

## 2024-03-05 PROCEDURE — 87491 CHLMYD TRACH DNA AMP PROBE: CPT

## 2024-03-05 PROCEDURE — 87591 N.GONORRHOEAE DNA AMP PROB: CPT

## 2024-03-05 NOTE — PROGRESS NOTES
Subjective:   Patient ID: Katia Carey is a 23 year old female.    Pt presents with hx of palpitations. Had been fine for some time and had blood work done which was unremarkable. Pt has not seen cardiology yet for this.  Has had some floaters of both eyes recently. No pains and no visual issues. No eye injury or trauma. Pt wears contacts.  Pt is also requesting STI testing for GC/ Chlamydia. Pt has no symptoms. NO hx of STI's . Pt is sexually active.           History/Other:   Review of Systems  Current Outpatient Medications   Medication Sig Dispense Refill    nitrofurantoin monohydrate macro (MACROBID) 100 MG Oral Cap Take 1 capsule (100 mg total) by mouth 2 (two) times daily. (Patient not taking: Reported on 11/1/2023) 14 capsule 0     Allergies:No Known Allergies    Objective:   Physical Exam  Constitutional:       Appearance: Normal appearance.   Cardiovascular:      Rate and Rhythm: Normal rate and regular rhythm.      Pulses: Normal pulses.      Heart sounds: Normal heart sounds.   Pulmonary:      Effort: Pulmonary effort is normal.      Breath sounds: Normal breath sounds.   Neurological:      Mental Status: She is alert.   Psychiatric:         Mood and Affect: Mood normal.         Behavior: Behavior normal.         Assessment & Plan:   1. Palpitation:  - After discussion, will send to cardiologyt for further evaluation and treatment; To call if any significant symptoms.      2. Vitreous floaters of both eyes:  - After discussion, will send to opthalmology for further evaluation and treatment; To call if any significant symptoms.      3. Routine screening for STI (sexually transmitted infection):  - After discussion, will check STD testing as discussed; To call if symptoms; Encouraged safer sexual practices; Follow up and further management after testing         Orders Placed This Encounter   Procedures    Chlamydia/Gc Amplification       Meds This Visit:  Requested Prescriptions      No  prescriptions requested or ordered in this encounter       Imaging & Referrals:  CARDIO - INTERNAL  OPHTHALMOLOGY - INTERNAL

## 2024-03-06 LAB
C TRACH DNA SPEC QL NAA+PROBE: NEGATIVE
N GONORRHOEA DNA SPEC QL NAA+PROBE: NEGATIVE

## 2024-03-12 ENCOUNTER — NURSE TRIAGE (OUTPATIENT)
Dept: TELEHEALTH | Age: 24
End: 2024-03-12

## 2024-03-12 ENCOUNTER — TELEPHONE (OUTPATIENT)
Dept: OBGYN | Age: 24
End: 2024-03-12

## 2024-03-26 NOTE — TELEPHONE ENCOUNTER
Orders for titer signed and notified through [facility]
Spoke with patient ( verified)--did complete TB lab for school, but now needs additional labs for clinicals.  Patient will send attachment of required labs via [address] (patient did already complete [facility] TB)    Labs pended for approval with Dx: geovanni
16

## 2024-04-13 ENCOUNTER — OFFICE VISIT (OUTPATIENT)
Dept: FAMILY MEDICINE CLINIC | Facility: CLINIC | Age: 24
End: 2024-04-13
Payer: COMMERCIAL

## 2024-04-13 VITALS
DIASTOLIC BLOOD PRESSURE: 76 MMHG | BODY MASS INDEX: 31.34 KG/M2 | TEMPERATURE: 99 F | HEIGHT: 61 IN | OXYGEN SATURATION: 99 % | HEART RATE: 100 BPM | WEIGHT: 166 LBS | SYSTOLIC BLOOD PRESSURE: 137 MMHG

## 2024-04-13 DIAGNOSIS — Z86.19 HISTORY OF CHLAMYDIA: ICD-10-CM

## 2024-04-13 DIAGNOSIS — R87.612 LGSIL ON PAP SMEAR OF CERVIX: ICD-10-CM

## 2024-04-13 DIAGNOSIS — N94.6 DYSMENORRHEA: Primary | ICD-10-CM

## 2024-04-13 DIAGNOSIS — N93.0 POSTCOITAL BLEEDING: ICD-10-CM

## 2024-04-13 PROCEDURE — 3008F BODY MASS INDEX DOCD: CPT | Performed by: STUDENT IN AN ORGANIZED HEALTH CARE EDUCATION/TRAINING PROGRAM

## 2024-04-13 PROCEDURE — 3075F SYST BP GE 130 - 139MM HG: CPT | Performed by: STUDENT IN AN ORGANIZED HEALTH CARE EDUCATION/TRAINING PROGRAM

## 2024-04-13 PROCEDURE — 99214 OFFICE O/P EST MOD 30 MIN: CPT | Performed by: STUDENT IN AN ORGANIZED HEALTH CARE EDUCATION/TRAINING PROGRAM

## 2024-04-13 PROCEDURE — 3078F DIAST BP <80 MM HG: CPT | Performed by: STUDENT IN AN ORGANIZED HEALTH CARE EDUCATION/TRAINING PROGRAM

## 2024-04-13 NOTE — PROGRESS NOTES
HPI:    Patient ID: Katia Carey is a 23 year old female.    HPI  Pt presenting for follow-up.    H/o LSIL 4/2023    Reports regular menses   Notes increased cramping, passage of large blood clots  Associated leg tingling, pelvic achiness    Last intercourse with postcoital bleeding x2days  Denies pain, discharge  Blood tinged mucus  H/o chlamydia infection    Has been working out for weight loss        Review of Systems   A comprehensive 10 point review of systems was completed.  Pertinent positives and negatives noted in the the HPI.       Current Outpatient Medications   Medication Sig Dispense Refill    nitrofurantoin monohydrate macro (MACROBID) 100 MG Oral Cap Take 1 capsule (100 mg total) by mouth 2 (two) times daily. (Patient not taking: Reported on 11/1/2023) 14 capsule 0     Allergies:No Known Allergies   Vitals:    04/13/24 1010   BP: 137/76   Pulse: 100   Temp: 98.7 °F (37.1 °C)   TempSrc: Temporal   SpO2: 99%   Weight: 166 lb (75.3 kg)   Height: 5' 1\" (1.549 m)       Body mass index is 31.37 kg/m².   PHYSICAL EXAM:   Physical Exam  Vitals reviewed.   Constitutional:       General: She is not in acute distress.  Eyes:      Conjunctiva/sclera: Conjunctivae normal.   Cardiovascular:      Rate and Rhythm: Normal rate and regular rhythm.      Pulses: Normal pulses.   Pulmonary:      Effort: Pulmonary effort is normal. No respiratory distress.      Breath sounds: Normal breath sounds.   Abdominal:      General: Bowel sounds are normal.      Palpations: Abdomen is soft.      Tenderness: There is no abdominal tenderness. There is no guarding.   Genitourinary:     General: Normal vulva.      Exam position: Lithotomy position.      Vagina: Vaginal discharge present.      Cervix: Normal.      Uterus: Not tender.       Adnexa:         Right: No tenderness.          Left: No tenderness.     Musculoskeletal:      Right lower leg: No edema.      Left lower leg: No edema.   Neurological:      General: No  focal deficit present.      Mental Status: She is alert and oriented to person, place, and time. Mental status is at baseline.   Psychiatric:         Mood and Affect: Mood normal.         Behavior: Behavior normal.             ASSESSMENT/PLAN:   1. Dysmenorrhea  Discussed role of pelvic US  - will awaiting Pap, vaginosis, GC/Chl  - discussed red flags for urgent reevaluation  - US PELVIS (TRANSABDOMINAL PELVIS)  (CPT=76856); Future    2. LGSIL on Pap smear of cervix  - ThinPrep PAP with HPV Reflex Request [E]; Future  - ThinPrep PAP with HPV Reflex Request [E]  - ThinPrep PAP with HPV Reflex Request    3. Postcoital bleeding  - will check vaginosis swab  - continue gentle hygiene  - Vaginitis Vaginosis PCR Panel; Future  - Vaginitis Vaginosis PCR Panel    4. History of chlamydia  - Chlamydia/Gc Amplification; Future    Pt verbalized understanding and agrees with plan.    Orders Placed This Encounter   Procedures    ThinPrep PAP with HPV Reflex Request [E]    ThinPrep PAP with HPV Reflex Request    Vaginitis Vaginosis PCR Panel    Chlamydia/Gc Amplification       Meds This Visit:  Requested Prescriptions      No prescriptions requested or ordered in this encounter       Imaging & Referrals:  US PELVIS (TRANSABDOMINAL PELVIS)  (CPT=76856)         ID#[id number]

## 2024-04-14 LAB
BV BACTERIA DNA VAG QL NAA+PROBE: NEGATIVE
C GLABRATA DNA VAG QL NAA+PROBE: NEGATIVE
C KRUSEI DNA VAG QL NAA+PROBE: NEGATIVE
CANDIDA DNA VAG QL NAA+PROBE: NEGATIVE
T VAGINALIS DNA VAG QL NAA+PROBE: NEGATIVE

## 2024-05-06 LAB — HPV I/H RISK 1 DNA SPEC QL NAA+PROBE: NEGATIVE

## 2024-07-02 ENCOUNTER — LAB ENCOUNTER (OUTPATIENT)
Dept: LAB | Age: 24
End: 2024-07-02
Attending: STUDENT IN AN ORGANIZED HEALTH CARE EDUCATION/TRAINING PROGRAM
Payer: COMMERCIAL

## 2024-07-02 DIAGNOSIS — Z86.19 HISTORY OF CHLAMYDIA: ICD-10-CM

## 2024-07-02 PROCEDURE — 87491 CHLMYD TRACH DNA AMP PROBE: CPT

## 2024-07-02 PROCEDURE — 87591 N.GONORRHOEAE DNA AMP PROB: CPT

## 2024-07-03 LAB
C TRACH DNA SPEC QL NAA+PROBE: NEGATIVE
N GONORRHOEA DNA SPEC QL NAA+PROBE: NEGATIVE

## 2024-09-24 ENCOUNTER — LAB ENCOUNTER (OUTPATIENT)
Dept: LAB | Facility: HOSPITAL | Age: 24
End: 2024-09-24
Attending: INTERNAL MEDICINE
Payer: COMMERCIAL

## 2024-09-24 DIAGNOSIS — R53.81 MALAISE AND FATIGUE: Primary | ICD-10-CM

## 2024-09-24 DIAGNOSIS — R53.83 MALAISE AND FATIGUE: Primary | ICD-10-CM

## 2024-09-24 DIAGNOSIS — R00.2 PALPITATION: ICD-10-CM

## 2024-09-24 DIAGNOSIS — I49.3 PREMATURE VENTRICULAR CONTRACTION: ICD-10-CM

## 2024-09-24 LAB
ALBUMIN SERPL-MCNC: 4.3 G/DL (ref 3.2–4.8)
ALBUMIN/GLOB SERPL: 1.3 {RATIO} (ref 1–2)
ALP LIVER SERPL-CCNC: 54 U/L
ALT SERPL-CCNC: 14 U/L
ANION GAP SERPL CALC-SCNC: 3 MMOL/L (ref 0–18)
AST SERPL-CCNC: 19 U/L (ref ?–34)
BASOPHILS # BLD AUTO: 0.04 X10(3) UL (ref 0–0.2)
BASOPHILS NFR BLD AUTO: 0.4 %
BILIRUB SERPL-MCNC: 0.8 MG/DL (ref 0.3–1.2)
BUN BLD-MCNC: 12 MG/DL (ref 9–23)
BUN/CREAT SERPL: 15.4 (ref 10–20)
CALCIUM BLD-MCNC: 9.4 MG/DL (ref 8.7–10.4)
CHLORIDE SERPL-SCNC: 108 MMOL/L (ref 98–112)
CHOLEST SERPL-MCNC: 144 MG/DL (ref ?–200)
CO2 SERPL-SCNC: 28 MMOL/L (ref 21–32)
CREAT BLD-MCNC: 0.78 MG/DL
DEPRECATED RDW RBC AUTO: 46.8 FL (ref 35.1–46.3)
EGFRCR SERPLBLD CKD-EPI 2021: 108 ML/MIN/1.73M2 (ref 60–?)
EOSINOPHIL # BLD AUTO: 0.15 X10(3) UL (ref 0–0.7)
EOSINOPHIL NFR BLD AUTO: 1.6 %
ERYTHROCYTE [DISTWIDTH] IN BLOOD BY AUTOMATED COUNT: 13.1 % (ref 11–15)
FASTING PATIENT LIPID ANSWER: YES
FASTING STATUS PATIENT QL REPORTED: YES
GLOBULIN PLAS-MCNC: 3.2 G/DL (ref 2–3.5)
GLUCOSE BLD-MCNC: 96 MG/DL (ref 70–99)
HCT VFR BLD AUTO: 38.1 %
HDLC SERPL-MCNC: 67 MG/DL (ref 40–59)
HGB BLD-MCNC: 12.8 G/DL
IMM GRANULOCYTES # BLD AUTO: 0.04 X10(3) UL (ref 0–1)
IMM GRANULOCYTES NFR BLD: 0.4 %
LDLC SERPL CALC-MCNC: 65 MG/DL (ref ?–100)
LYMPHOCYTES # BLD AUTO: 2.71 X10(3) UL (ref 1–4)
LYMPHOCYTES NFR BLD AUTO: 28.4 %
MAGNESIUM SERPL-MCNC: 2 MG/DL (ref 1.6–2.6)
MCH RBC QN AUTO: 33 PG (ref 26–34)
MCHC RBC AUTO-ENTMCNC: 33.6 G/DL (ref 31–37)
MCV RBC AUTO: 98.2 FL
MONOCYTES # BLD AUTO: 0.87 X10(3) UL (ref 0.1–1)
MONOCYTES NFR BLD AUTO: 9.1 %
NEUTROPHILS # BLD AUTO: 5.72 X10 (3) UL (ref 1.5–7.7)
NEUTROPHILS # BLD AUTO: 5.72 X10(3) UL (ref 1.5–7.7)
NEUTROPHILS NFR BLD AUTO: 60.1 %
NONHDLC SERPL-MCNC: 77 MG/DL (ref ?–130)
OSMOLALITY SERPL CALC.SUM OF ELEC: 288 MOSM/KG (ref 275–295)
PLATELET # BLD AUTO: 253 10(3)UL (ref 150–450)
POTASSIUM SERPL-SCNC: 4.2 MMOL/L (ref 3.5–5.1)
PROT SERPL-MCNC: 7.5 G/DL (ref 5.7–8.2)
RBC # BLD AUTO: 3.88 X10(6)UL
SODIUM SERPL-SCNC: 139 MMOL/L (ref 136–145)
TRIGL SERPL-MCNC: 58 MG/DL (ref 30–149)
TSI SER-ACNC: 2.1 MIU/ML (ref 0.55–4.78)
VLDLC SERPL CALC-MCNC: 9 MG/DL (ref 0–30)
WBC # BLD AUTO: 9.5 X10(3) UL (ref 4–11)

## 2024-09-24 PROCEDURE — 84443 ASSAY THYROID STIM HORMONE: CPT

## 2024-09-24 PROCEDURE — 36415 COLL VENOUS BLD VENIPUNCTURE: CPT

## 2024-09-24 PROCEDURE — 80053 COMPREHEN METABOLIC PANEL: CPT

## 2024-09-24 PROCEDURE — 85025 COMPLETE CBC W/AUTO DIFF WBC: CPT

## 2024-09-24 PROCEDURE — 83735 ASSAY OF MAGNESIUM: CPT

## 2024-09-24 PROCEDURE — 80061 LIPID PANEL: CPT

## 2024-10-29 ENCOUNTER — MED REC SCAN ONLY (OUTPATIENT)
Dept: FAMILY MEDICINE CLINIC | Facility: CLINIC | Age: 24
End: 2024-10-29

## 2024-12-23 ENCOUNTER — OFFICE VISIT (OUTPATIENT)
Dept: FAMILY MEDICINE CLINIC | Facility: CLINIC | Age: 24
End: 2024-12-23

## 2024-12-23 VITALS
TEMPERATURE: 98 F | BODY MASS INDEX: 31.22 KG/M2 | HEART RATE: 99 BPM | DIASTOLIC BLOOD PRESSURE: 79 MMHG | HEIGHT: 62.7 IN | SYSTOLIC BLOOD PRESSURE: 112 MMHG | WEIGHT: 174 LBS | RESPIRATION RATE: 22 BRPM

## 2024-12-23 DIAGNOSIS — Z11.3 ROUTINE SCREENING FOR STI (SEXUALLY TRANSMITTED INFECTION): ICD-10-CM

## 2024-12-23 DIAGNOSIS — Z00.00 ROUTINE PHYSICAL EXAMINATION: Primary | ICD-10-CM

## 2024-12-23 NOTE — PROGRESS NOTES
Subjective:   Patient ID: Katia Carey is a 24 year old female.    Patient is here for routine physical exam. No acute issues. No significant chronic medical problems. Patient is requesting testing. Diet and exercise have been good.   Pt saw cardiology for palpitations. Had work up done which was unremarkable. Had seen Dr Diallo.    Past medical history, family history, and social history were reviewed.  Has order for pelvic ultrasound ordered by Dr Balderas. Has not scheduled yet. Needs order.        History/Other:   Review of Systems   Constitutional: Negative.  Negative for fever.   HENT: Negative.     Eyes: Negative.    Respiratory: Negative.  Negative for shortness of breath.    Cardiovascular: Negative.  Negative for chest pain. Palpitations: hx.  Gastrointestinal: Negative.  Negative for abdominal pain and blood in stool.   Endocrine: Negative.    Genitourinary: Negative.  Negative for difficulty urinating and vaginal bleeding.   Musculoskeletal: Negative.    Skin: Negative.    Allergic/Immunologic: Negative.    Neurological: Negative.  Negative for dizziness and headaches.   Hematological: Negative.    Psychiatric/Behavioral: Negative.  Negative for dysphoric mood. The patient is not nervous/anxious.      No current outpatient medications on file.     Allergies:Allergies[1]    Objective:   Physical Exam  Constitutional:       Appearance: Normal appearance. She is well-developed.   HENT:      Head: Normocephalic and atraumatic.      Right Ear: Tympanic membrane and external ear normal.      Left Ear: Tympanic membrane and external ear normal.      Nose: Nose normal.      Mouth/Throat:      Mouth: Mucous membranes are moist.      Pharynx: No posterior oropharyngeal erythema.   Eyes:      Conjunctiva/sclera: Conjunctivae normal.   Neck:      Thyroid: No thyroid mass, thyromegaly or thyroid tenderness.   Cardiovascular:      Rate and Rhythm: Normal rate and regular rhythm.      Pulses: Normal pulses.       Heart sounds: Normal heart sounds.   Pulmonary:      Effort: Pulmonary effort is normal.      Breath sounds: Normal breath sounds.   Abdominal:      General: Abdomen is flat. There is no distension.      Palpations: Abdomen is soft.      Tenderness: There is no abdominal tenderness. There is no guarding or rebound.   Genitourinary:     Vagina: Normal.   Musculoskeletal:         General: Normal range of motion.      Cervical back: Normal range of motion and neck supple.   Lymphadenopathy:      Cervical: No cervical adenopathy.   Skin:     General: Skin is warm.   Neurological:      General: No focal deficit present.      Mental Status: She is alert and oriented to person, place, and time.      Deep Tendon Reflexes: Reflexes are normal and symmetric. Reflexes normal.   Psychiatric:         Mood and Affect: Mood normal.         Behavior: Behavior normal.         Thought Content: Thought content normal.         Judgment: Judgment normal.         Assessment & Plan:   1. Routine physical examination /   2. Routine screening for STI (sexually transmitted infection):  - Exam is unremarkable. Screening tests were discussed, and after discussion, will check lab work as below. Healthy diet, exercise, and weight were discussed. To call if problems and follow up and further management after testing. Routine follow up.       Orders Placed This Encounter   Procedures    Assay, Thyroid Stim Hormone    Lipid Panel    Comp Metabolic Panel (14)    CBC, Platelet; No Differential    Urinalysis, Routine    Hemoglobin A1C    Chlamydia/Gc Amplification       Meds This Visit:  Requested Prescriptions      No prescriptions requested or ordered in this encounter       Imaging & Referrals:  None         [1] No Known Allergies

## 2024-12-24 ENCOUNTER — LAB ENCOUNTER (OUTPATIENT)
Dept: LAB | Age: 24
End: 2024-12-24
Attending: FAMILY MEDICINE
Payer: COMMERCIAL

## 2024-12-24 DIAGNOSIS — Z00.00 ROUTINE PHYSICAL EXAMINATION: ICD-10-CM

## 2024-12-24 LAB
ALBUMIN SERPL-MCNC: 4.4 G/DL (ref 3.2–4.8)
ALBUMIN/GLOB SERPL: 1.4 {RATIO} (ref 1–2)
ALP LIVER SERPL-CCNC: 51 U/L
ALT SERPL-CCNC: 11 U/L
ANION GAP SERPL CALC-SCNC: 4 MMOL/L (ref 0–18)
AST SERPL-CCNC: 20 U/L (ref ?–34)
BILIRUB SERPL-MCNC: 0.7 MG/DL (ref 0.3–1.2)
BILIRUB UR QL: NEGATIVE
BUN BLD-MCNC: 10 MG/DL (ref 9–23)
BUN/CREAT SERPL: 12.7 (ref 10–20)
CALCIUM BLD-MCNC: 9.7 MG/DL (ref 8.7–10.4)
CHLORIDE SERPL-SCNC: 106 MMOL/L (ref 98–112)
CHOLEST SERPL-MCNC: 161 MG/DL (ref ?–200)
CO2 SERPL-SCNC: 29 MMOL/L (ref 21–32)
COLOR UR: YELLOW
CREAT BLD-MCNC: 0.79 MG/DL
DEPRECATED RDW RBC AUTO: 45.5 FL (ref 35.1–46.3)
EGFRCR SERPLBLD CKD-EPI 2021: 106 ML/MIN/1.73M2 (ref 60–?)
ERYTHROCYTE [DISTWIDTH] IN BLOOD BY AUTOMATED COUNT: 12.5 % (ref 11–15)
EST. AVERAGE GLUCOSE BLD GHB EST-MCNC: 111 MG/DL (ref 68–126)
FASTING PATIENT LIPID ANSWER: YES
FASTING STATUS PATIENT QL REPORTED: YES
GLOBULIN PLAS-MCNC: 3.1 G/DL (ref 2–3.5)
GLUCOSE BLD-MCNC: 99 MG/DL (ref 70–99)
GLUCOSE UR-MCNC: NORMAL MG/DL
HBA1C MFR BLD: 5.5 % (ref ?–5.7)
HCT VFR BLD AUTO: 39.5 %
HDLC SERPL-MCNC: 66 MG/DL (ref 40–59)
HGB BLD-MCNC: 13.5 G/DL
HGB UR QL STRIP.AUTO: NEGATIVE
KETONES UR-MCNC: NEGATIVE MG/DL
LDLC SERPL CALC-MCNC: 79 MG/DL (ref ?–100)
LEUKOCYTE ESTERASE UR QL STRIP.AUTO: 25
MCH RBC QN AUTO: 33.8 PG (ref 26–34)
MCHC RBC AUTO-ENTMCNC: 34.2 G/DL (ref 31–37)
MCV RBC AUTO: 98.8 FL
NITRITE UR QL STRIP.AUTO: NEGATIVE
NONHDLC SERPL-MCNC: 95 MG/DL (ref ?–130)
OSMOLALITY SERPL CALC.SUM OF ELEC: 287 MOSM/KG (ref 275–295)
PH UR: 5.5 [PH] (ref 5–8)
PLATELET # BLD AUTO: 252 10(3)UL (ref 150–450)
POTASSIUM SERPL-SCNC: 3.9 MMOL/L (ref 3.5–5.1)
PROT SERPL-MCNC: 7.5 G/DL (ref 5.7–8.2)
PROT UR-MCNC: NEGATIVE MG/DL
RBC # BLD AUTO: 4 X10(6)UL
SODIUM SERPL-SCNC: 139 MMOL/L (ref 136–145)
SP GR UR STRIP: 1.02 (ref 1–1.03)
TRIGL SERPL-MCNC: 86 MG/DL (ref 30–149)
TSI SER-ACNC: 2.07 UIU/ML (ref 0.55–4.78)
UROBILINOGEN UR STRIP-ACNC: NORMAL
VLDLC SERPL CALC-MCNC: 13 MG/DL (ref 0–30)
WBC # BLD AUTO: 7.6 X10(3) UL (ref 4–11)

## 2024-12-24 PROCEDURE — 84443 ASSAY THYROID STIM HORMONE: CPT

## 2024-12-24 PROCEDURE — 80053 COMPREHEN METABOLIC PANEL: CPT

## 2024-12-24 PROCEDURE — 81001 URINALYSIS AUTO W/SCOPE: CPT

## 2024-12-24 PROCEDURE — 80061 LIPID PANEL: CPT

## 2024-12-24 PROCEDURE — 83036 HEMOGLOBIN GLYCOSYLATED A1C: CPT

## 2024-12-24 PROCEDURE — 85027 COMPLETE CBC AUTOMATED: CPT

## 2024-12-24 PROCEDURE — 36415 COLL VENOUS BLD VENIPUNCTURE: CPT

## 2025-03-26 ENCOUNTER — HOSPITAL ENCOUNTER (OUTPATIENT)
Dept: ULTRASOUND IMAGING | Facility: HOSPITAL | Age: 25
Discharge: HOME | End: 2025-03-26
Attending: STUDENT IN AN ORGANIZED HEALTH CARE EDUCATION/TRAINING PROGRAM
Payer: COMMERCIAL

## 2025-03-26 ENCOUNTER — LAB ENCOUNTER (OUTPATIENT)
Dept: LAB | Facility: HOSPITAL | Age: 25
End: 2025-03-26
Attending: STUDENT IN AN ORGANIZED HEALTH CARE EDUCATION/TRAINING PROGRAM
Payer: COMMERCIAL

## 2025-03-26 DIAGNOSIS — Z11.3 ROUTINE SCREENING FOR STI (SEXUALLY TRANSMITTED INFECTION): ICD-10-CM

## 2025-03-26 DIAGNOSIS — N94.6 DYSMENORRHEA: ICD-10-CM

## 2025-03-26 PROCEDURE — 76856 US EXAM PELVIC COMPLETE: CPT | Performed by: STUDENT IN AN ORGANIZED HEALTH CARE EDUCATION/TRAINING PROGRAM

## 2025-03-26 PROCEDURE — 87591 N.GONORRHOEAE DNA AMP PROB: CPT

## 2025-03-26 PROCEDURE — 87491 CHLMYD TRACH DNA AMP PROBE: CPT

## 2025-03-26 PROCEDURE — 76830 TRANSVAGINAL US NON-OB: CPT | Performed by: STUDENT IN AN ORGANIZED HEALTH CARE EDUCATION/TRAINING PROGRAM

## 2025-03-27 LAB
C TRACH DNA SPEC QL NAA+PROBE: NEGATIVE
N GONORRHOEA DNA SPEC QL NAA+PROBE: NEGATIVE

## 2025-04-01 ENCOUNTER — PATIENT MESSAGE (OUTPATIENT)
Dept: FAMILY MEDICINE CLINIC | Facility: CLINIC | Age: 25
End: 2025-04-01

## 2025-04-01 DIAGNOSIS — R93.89 ABNORMAL PELVIC ULTRASOUND: Primary | ICD-10-CM

## 2025-04-01 NOTE — TELEPHONE ENCOUNTER
Please see patient's mychart message. In response to result note from US pelvis 03/26/25:    Pelvic ultrasound shows 1 fibroid measuring 1.8cm, which can contribute to abnormal/heavy bleeding. You also have multiple small follicles in both ovaries, suggestive of possible PCOS. Please contact us with any questions.   Written by Natty Balderas MD on 3/31/2025  7:47 PM CDT  Seen by patient Katia Rangel Aurelio on 4/1/2025  8:18 AM

## 2025-04-02 NOTE — TELEPHONE ENCOUNTER
please see patient mychart message below. Thank you   Please respond directly to the patient if no additional staff support is required.

## 2025-05-31 ENCOUNTER — LAB ENCOUNTER (OUTPATIENT)
Dept: LAB | Age: 25
End: 2025-05-31
Attending: STUDENT IN AN ORGANIZED HEALTH CARE EDUCATION/TRAINING PROGRAM
Payer: COMMERCIAL

## 2025-05-31 DIAGNOSIS — R93.89 ABNORMAL PELVIC ULTRASOUND: ICD-10-CM

## 2025-05-31 LAB
DHEA-S SERPL-MCNC: 239.8 UG/DL (ref 25.9–460.2)
ESTRADIOL SERPL-MCNC: 81.9 PG/ML
PROGEST SERPL-MCNC: 0.92 NG/ML

## 2025-05-31 PROCEDURE — 82670 ASSAY OF TOTAL ESTRADIOL: CPT

## 2025-05-31 PROCEDURE — 84144 ASSAY OF PROGESTERONE: CPT

## 2025-05-31 PROCEDURE — 84410 TESTOSTERONE BIOAVAILABLE: CPT

## 2025-05-31 PROCEDURE — 36415 COLL VENOUS BLD VENIPUNCTURE: CPT

## 2025-05-31 PROCEDURE — 82627 DEHYDROEPIANDROSTERONE: CPT

## 2025-06-01 ENCOUNTER — PATIENT MESSAGE (OUTPATIENT)
Dept: FAMILY MEDICINE CLINIC | Facility: CLINIC | Age: 25
End: 2025-06-01

## 2025-06-05 ENCOUNTER — OFFICE VISIT (OUTPATIENT)
Dept: FAMILY MEDICINE CLINIC | Facility: CLINIC | Age: 25
End: 2025-06-05

## 2025-06-05 VITALS
BODY MASS INDEX: 32.65 KG/M2 | HEART RATE: 75 BPM | OXYGEN SATURATION: 96 % | SYSTOLIC BLOOD PRESSURE: 92 MMHG | HEIGHT: 62.6 IN | TEMPERATURE: 98 F | DIASTOLIC BLOOD PRESSURE: 60 MMHG | WEIGHT: 182 LBS

## 2025-06-05 DIAGNOSIS — Z12.4 SCREENING FOR MALIGNANT NEOPLASM OF CERVIX: Primary | ICD-10-CM

## 2025-06-05 NOTE — PROGRESS NOTES
HPI:      Patient ID: Katia Carey is a 24 year old female.    HPI  Pt presenting for Pap screening.    H/o LSIL 2023  Denies any physical concerns today    Recent pelvic US  Awaiting testosterone results    Review of Systems   A comprehensive 10 point review of systems was completed.  Pertinent positives and negatives noted in the the HPI.     Current Medications[1]  Allergies:Allergies[2]   Vitals:    06/05/25 0805   BP: 92/60   Pulse: 75   Temp: 97.9 °F (36.6 °C)   SpO2: 96%   Weight: 182 lb (82.6 kg)   Height: 5' 2.6\" (1.59 m)       Body mass index is 32.65 kg/m².   PHYSICAL EXAM:   Physical Exam  Vitals reviewed.   Constitutional:       General: She is not in acute distress.  Cardiovascular:      Rate and Rhythm: Normal rate.   Pulmonary:      Effort: Pulmonary effort is normal.   Genitourinary:     General: Normal vulva.      Exam position: Lithotomy position.      Vagina: Normal.      Cervix: Friability present.      Uterus: Not tender.       Adnexa:         Right: No tenderness.          Left: No tenderness.     Musculoskeletal:      Right lower leg: No edema.      Left lower leg: No edema.   Neurological:      Mental Status: She is alert.             ASSESSMENT/PLAN:   1. Screening for malignant neoplasm of cervix  - ThinPrep PAP with HPV Reflex Request [E]; Future  - ThinPrep PAP with HPV Reflex Request [E]    Pt verbalized understanding and agrees with plan.    Orders Placed This Encounter   Procedures    ThinPrep PAP with HPV Reflex Request [E]       Meds This Visit:  Requested Prescriptions      No prescriptions requested or ordered in this encounter       Imaging & Referrals:  None         ID#[id number]       [1]   No current outpatient medications on file.   [2] No Known Allergies

## 2025-06-06 LAB
SEX HORM BIND GLOB: 51.4 NMOL/L
TESTOST % FREE+WEAK BND: 14 %
TESTOST FREE+WEAK BND: 6.9 NG/DL
TESTOSTERONE TOT /MS: 49.4 NG/DL

## 2025-06-11 LAB — HPV E6+E7 MRNA CVX QL NAA+PROBE: POSITIVE

## 2025-08-11 ENCOUNTER — OFFICE VISIT (OUTPATIENT)
Dept: FAMILY MEDICINE CLINIC | Facility: CLINIC | Age: 25
End: 2025-08-11

## 2025-08-11 VITALS
RESPIRATION RATE: 20 BRPM | BODY MASS INDEX: 31.58 KG/M2 | HEIGHT: 62.6 IN | DIASTOLIC BLOOD PRESSURE: 79 MMHG | SYSTOLIC BLOOD PRESSURE: 119 MMHG | TEMPERATURE: 98 F | WEIGHT: 176 LBS | HEART RATE: 80 BPM

## 2025-08-11 DIAGNOSIS — M79.10 MYALGIA: ICD-10-CM

## 2025-08-11 DIAGNOSIS — R53.83 FATIGUE, UNSPECIFIED TYPE: Primary | ICD-10-CM

## 2025-08-11 PROCEDURE — 3074F SYST BP LT 130 MM HG: CPT | Performed by: FAMILY MEDICINE

## 2025-08-11 PROCEDURE — 3008F BODY MASS INDEX DOCD: CPT | Performed by: FAMILY MEDICINE

## 2025-08-11 PROCEDURE — 99214 OFFICE O/P EST MOD 30 MIN: CPT | Performed by: FAMILY MEDICINE

## 2025-08-11 PROCEDURE — 3078F DIAST BP <80 MM HG: CPT | Performed by: FAMILY MEDICINE

## 2025-08-11 RX ORDER — METOPROLOL SUCCINATE 25 MG/1
TABLET, EXTENDED RELEASE ORAL
COMMUNITY

## 2025-08-21 ENCOUNTER — LAB ENCOUNTER (OUTPATIENT)
Dept: LAB | Facility: HOSPITAL | Age: 25
End: 2025-08-21
Attending: FAMILY MEDICINE

## 2025-08-21 DIAGNOSIS — Z00.00 ROUTINE PHYSICAL EXAMINATION: ICD-10-CM

## 2025-08-21 DIAGNOSIS — Z11.3 ROUTINE SCREENING FOR STI (SEXUALLY TRANSMITTED INFECTION): ICD-10-CM

## 2025-08-21 DIAGNOSIS — M79.10 MYALGIA: ICD-10-CM

## 2025-08-21 DIAGNOSIS — R53.83 FATIGUE, UNSPECIFIED TYPE: ICD-10-CM

## 2025-08-21 LAB
ALBUMIN SERPL-MCNC: 4.5 G/DL (ref 3.2–4.8)
ALBUMIN/GLOB SERPL: 1.3 (ref 1–2)
ALP LIVER SERPL-CCNC: 50 U/L (ref 37–98)
ALT SERPL-CCNC: 17 U/L (ref 10–49)
ANION GAP SERPL CALC-SCNC: 6 MMOL/L (ref 0–18)
AST SERPL-CCNC: 19 U/L (ref ?–34)
BASOPHILS # BLD AUTO: 0.03 X10(3) UL (ref 0–0.2)
BASOPHILS NFR BLD AUTO: 0.4 %
BILIRUB SERPL-MCNC: 0.7 MG/DL (ref 0.3–1.2)
BUN BLD-MCNC: 8 MG/DL (ref 9–23)
BUN/CREAT SERPL: 9.5 (ref 10–20)
C TRACH DNA SPEC QL NAA+PROBE: NEGATIVE
CALCIUM BLD-MCNC: 9.4 MG/DL (ref 8.7–10.4)
CHLORIDE SERPL-SCNC: 106 MMOL/L (ref 98–112)
CK SERPL-CCNC: 124 U/L (ref 34–145)
CO2 SERPL-SCNC: 25 MMOL/L (ref 21–32)
CREAT BLD-MCNC: 0.84 MG/DL (ref 0.55–1.02)
DEPRECATED RDW RBC AUTO: 47.2 FL (ref 35.1–46.3)
EGFRCR SERPLBLD CKD-EPI 2021: 99 ML/MIN/1.73M2 (ref 60–?)
EOSINOPHIL # BLD AUTO: 0.29 X10(3) UL (ref 0–0.7)
EOSINOPHIL NFR BLD AUTO: 3.6 %
ERYTHROCYTE [DISTWIDTH] IN BLOOD BY AUTOMATED COUNT: 12.9 % (ref 11–15)
FASTING STATUS PATIENT QL REPORTED: YES
GLOBULIN PLAS-MCNC: 3.4 G/DL (ref 2–3.5)
GLUCOSE BLD-MCNC: 94 MG/DL (ref 70–99)
HCT VFR BLD AUTO: 39.5 % (ref 35–48)
HGB BLD-MCNC: 13.1 G/DL (ref 12–16)
IMM GRANULOCYTES # BLD AUTO: 0.02 X10(3) UL (ref 0–1)
IMM GRANULOCYTES NFR BLD: 0.2 %
LYMPHOCYTES # BLD AUTO: 3.09 X10(3) UL (ref 1–4)
LYMPHOCYTES NFR BLD AUTO: 38.2 %
MCH RBC QN AUTO: 32.6 PG (ref 26–34)
MCHC RBC AUTO-ENTMCNC: 33.2 G/DL (ref 31–37)
MCV RBC AUTO: 98.3 FL (ref 80–100)
MONOCYTES # BLD AUTO: 0.8 X10(3) UL (ref 0.1–1)
MONOCYTES NFR BLD AUTO: 9.9 %
N GONORRHOEA DNA SPEC QL NAA+PROBE: NEGATIVE
NEUTROPHILS # BLD AUTO: 3.85 X10 (3) UL (ref 1.5–7.7)
NEUTROPHILS # BLD AUTO: 3.85 X10(3) UL (ref 1.5–7.7)
NEUTROPHILS NFR BLD AUTO: 47.7 %
OSMOLALITY SERPL CALC.SUM OF ELEC: 282 MOSM/KG (ref 275–295)
PLATELET # BLD AUTO: 230 10(3)UL (ref 150–450)
POTASSIUM SERPL-SCNC: 4.2 MMOL/L (ref 3.5–5.1)
PROT SERPL-MCNC: 7.9 G/DL (ref 5.7–8.2)
RBC # BLD AUTO: 4.02 X10(6)UL (ref 3.8–5.3)
SODIUM SERPL-SCNC: 137 MMOL/L (ref 136–145)
TSI SER-ACNC: 2.1 UIU/ML (ref 0.55–4.78)
VIT B12 SERPL-MCNC: 471 PG/ML (ref 211–911)
VIT D+METAB SERPL-MCNC: 34.7 NG/ML (ref 30–100)
WBC # BLD AUTO: 8.1 X10(3) UL (ref 4–11)

## 2025-08-21 PROCEDURE — 87591 N.GONORRHOEAE DNA AMP PROB: CPT

## 2025-08-21 PROCEDURE — 36415 COLL VENOUS BLD VENIPUNCTURE: CPT

## 2025-08-21 PROCEDURE — 82306 VITAMIN D 25 HYDROXY: CPT

## 2025-08-21 PROCEDURE — 87491 CHLMYD TRACH DNA AMP PROBE: CPT

## 2025-08-21 PROCEDURE — 84443 ASSAY THYROID STIM HORMONE: CPT

## 2025-08-21 PROCEDURE — 80053 COMPREHEN METABOLIC PANEL: CPT

## 2025-08-21 PROCEDURE — 85025 COMPLETE CBC W/AUTO DIFF WBC: CPT

## 2025-08-21 PROCEDURE — 82607 VITAMIN B-12: CPT

## 2025-08-21 PROCEDURE — 82550 ASSAY OF CK (CPK): CPT

## (undated) NOTE — LETTER
March 1, 2017    Patient: Kandi Parson   Date of Visit: 3/1/2017       To Whom It May Concern:    Kandi Parson was seen and treated in our emergency department on 3/1/2017. She can return to school 3/3/17.     If you have any questions or conc

## (undated) NOTE — LETTER
[facility] [employer] of Child Health Examination       Student's Name  [place] Y[place] Title                           Date     Signature HEALTH HISTORY          TO BE COMPLETED AND SIGNED BY PARENT/GUARDIAN AND VERIFIED BY HEALTH CARE PROVIDER    ALLERGIES  (Food, drug, insect, other) MEDICATION  (List all prescribed or taken on a regular basis.)     Diagnosis of asthma?   Child wakes during adult)   Pulse 91   Temp 98.5 °F (36.9 °C) (Oral)   Ht 5' 3\" (1.6 m)   Wt 186 lb (84.4 kg)   LMP 10/17/2017 (Approximate)   Breastfeeding?  No   BMI 32.95 kg/m²     DIABETES SCREENING  BMI>85% age/sex  No And any two of the following:  Family History No Respiratory Yes                   Diagnosis of Asthma: No Mental Health Yes        Currently Prescribed Asthma Medication:            Quick-relief  medication (e.g. Short Acting Beta Antagonist): No          Controller medication (e.g. inhaled corticostero

## (undated) NOTE — ED AVS SNAPSHOT
[facility] in [place] [address].  [place] [address]    Phone:  [phone]    Fax:  [phone]           Eric Rome   MRN: [id number]    Department:  [facility] in [address] [place] [address]   Date of Visit not participate in your health insurance plan. This may result in a lower benefit level being available to you or other limited reimbursement.   The physician may seek payment directly from you for amounts other than your deductible, co-payment, or co-insu prescription right away and begin taking the medication(s) as directed.   If you believe that any of the medications or instructions on this list is different from what your Primary Care doctor has instructed you - please continue to take your medications a - If you don’t have insurance, Sissy Fournier has partnered with [facility] to help you get signed up for insurance coverage.   [facility] is a Federal Navigator program that can help with your Affordable Care Act cover

## (undated) NOTE — ED AVS SNAPSHOT
[facility] Emergency Department    [place] [address]     [address] [place] [address]    Phone:  [phone]    Fax:  [phone]           Jake Judaism   MRN: [id number]    Department:  [facility] Emergency Department   Date of Visit:  3/ and Class Registration line at [phone] or find a doctor online by visiting [email or web address].    IF THERE IS ANY CHANGE OR WORSENING OF YOUR CONDITION, CALL YOUR PRIMARY CARE PHYSICIAN AT ONCE OR RETURN IMMEDIATELY TO [address] [place] [address].     If

## (undated) NOTE — LETTER
5/7/2018              [address] [facility] [address] [address]         To whom it may concern,    David Faria was seen in the office today. Please excuse her from school 5/8/18.       Sincerely,        Mary Perdue PA-C

## (undated) NOTE — LETTER
Madiha Chaidez, [address] [place], [place] [address]       06/01/20        Patient: Macy Finch   YOB: 2000   Date of Visit: 6/1/2020       Dear  Dr. Oscar Estrella MD,      Thank you for referring Macy Finch aminah

## (undated) NOTE — MR AVS SNAPSHOT
[address]  [phone]               Thank you for choosing us for your health care visit with Mukul Henriquez MD.  We are glad to serve you and happy to provide you with this summar balance signals to the brain. The auditory nerve carries sound signals to the brain.  The cochlea picks up sound waves and makes nerve signals.     Date Last Reviewed: 10/1/2016  © 6062-5389 The [address] [place] [address] Visit [facility] online at  [email or web address]

## (undated) NOTE — LETTER
[address]  [address] [facility]  Dept: [phone]  Dept Fax: [phone]  Loc: [phone]      April 5, 2017    Patient: Svetlana Weiss   Date of Visit: 4/5/2017       To Whom It May Concern:    Veronique Cooper

## (undated) NOTE — MR AVS SNAPSHOT
[address]  [address] [facility]  [phone]               Thank you for choosing us for your health care visit with Addison Monzon DO.   We are glad to serve you and happy to provide you with this summary authorization, such as [place], please feel free to schedule your appointment immediately. However, if you are unsure about the requirements for authorization, please wait 5-7 days and then contact your physician's office.  At that time, you will Commonly known as:  AUGMENTIN           ciprofloxacin-dexamethasone 0.3-0.1 % Susp   Place 4 drops into both ears 2 (two) times daily.    What changed:  how to take this   Commonly known as:  CIPRODEX           predniSONE 20 MG Tabs   Take 2 by mouth at lalo o 4 servings of water a day  o 3 servings of low-fat dairy a day  o 2 or less hours of screen time a day  o 1 or more hours of physical activity a day    To help children live healthy active lives, parents can:  o Be role models themselves by making health

## (undated) NOTE — ED AVS SNAPSHOT
[facility] Emergency Department    [place] [address]     [address] [place] [address]    Phone:  [phone]    Fax:  [phone]           Vj Moctezuma   MRN: [id number]    Department:  [facility] Emergency Department   Date of Visit:  3/ service to you, we would appreciate any positive or negative feedback related to the care you received in our emergency department. Please call our [address] at [phone]. Your Emergency Department team is here to serve you.   You are our top priori I certified that I have received a copy of the aftercare instructions; that these instructions have been explained to me; all questions pertaining to these instructions have been answered in a satisfactory manner.         [address] Proxy Access to your child’s MyChart go to [email or web address] and click on the   Sign Up Forms link in the Additional Information box on the right. MyChart Questions? Call [phone] for help.   MyChart is NOT to be used for urgent needs

## (undated) NOTE — LETTER
12/12/2017              Bailey Barr        [address] [address]         To whom it may concern,    Clifton Atwood was seen in the office day. Please excuse her from school 12/12/17-12/13/17 due to illness.       Sincerely,